# Patient Record
Sex: MALE | Race: WHITE | Employment: OTHER | URBAN - METROPOLITAN AREA
[De-identification: names, ages, dates, MRNs, and addresses within clinical notes are randomized per-mention and may not be internally consistent; named-entity substitution may affect disease eponyms.]

---

## 2017-09-28 ENCOUNTER — HOSPITAL ENCOUNTER (OUTPATIENT)
Dept: SURGERY | Age: 62
Discharge: HOME | End: 2017-09-28

## 2017-09-28 VITALS
WEIGHT: 215 LBS | DIASTOLIC BLOOD PRESSURE: 83 MMHG | HEART RATE: 63 BPM | TEMPERATURE: 98.3 F | OXYGEN SATURATION: 96 % | HEIGHT: 72 IN | SYSTOLIC BLOOD PRESSURE: 135 MMHG | BODY MASS INDEX: 29.12 KG/M2 | RESPIRATION RATE: 18 BRPM

## 2017-09-28 NOTE — PERIOP NOTES
Patient verified name, , and surgery as listed in [facility]. Patient provided medical/health information and PTA medications to the best of their ability. TYPE  CASE:1B  Orders per surgeon:  Received  Labs per surgeon: none  Labs per anesthesia protocol: none  EKG  :  NA    Patient provided with and instructed on education handouts including Guide to Surgery, blood transfusions, pain management, and hand hygiene for the family and community, and [facility] brochure. preop and instructions given per hospital policy. Instructed patient to continue previous medications as prescribed prior to surgery unless otherwise directed and to take the following medications the day of surgery according to anesthesia guidelines : bystolic, ultram prn, spiriva . Instructed patient to hold  the following medications: fish oil, ASA. Original medication prescription bottles not visualized during patient appointment. Patient teach back successful and patient demonstrates knowledge of instruction.

## 2017-10-04 ENCOUNTER — ANESTHESIA EVENT (OUTPATIENT)
Dept: SURGERY | Age: 62
End: 2017-10-04
Payer: COMMERCIAL

## 2017-10-04 RX ORDER — SODIUM CHLORIDE, SODIUM LACTATE, POTASSIUM CHLORIDE, CALCIUM CHLORIDE 600; 310; 30; 20 MG/100ML; MG/100ML; MG/100ML; MG/100ML
75 INJECTION, SOLUTION INTRAVENOUS CONTINUOUS
Status: CANCELLED | OUTPATIENT
Start: 2017-10-04

## 2017-10-04 RX ORDER — OXYCODONE HYDROCHLORIDE 5 MG/1
5 TABLET ORAL
Status: CANCELLED | OUTPATIENT
Start: 2017-10-04

## 2017-10-04 RX ORDER — HYDROCODONE BITARTRATE AND ACETAMINOPHEN 5; 325 MG/1; MG/1
2 TABLET ORAL AS NEEDED
Status: CANCELLED | OUTPATIENT
Start: 2017-10-04

## 2017-10-04 RX ORDER — HYDROMORPHONE HYDROCHLORIDE 2 MG/ML
0.5 INJECTION, SOLUTION INTRAMUSCULAR; INTRAVENOUS; SUBCUTANEOUS
Status: CANCELLED | OUTPATIENT
Start: 2017-10-04

## 2017-10-05 ENCOUNTER — HOSPITAL ENCOUNTER (OUTPATIENT)
Age: 62
Setting detail: OUTPATIENT SURGERY
Discharge: HOME | End: 2017-10-05
Attending: ORTHOPAEDIC SURGERY | Admitting: ORTHOPAEDIC SURGERY
Payer: COMMERCIAL

## 2017-10-05 ENCOUNTER — ANESTHESIA (OUTPATIENT)
Dept: SURGERY | Age: 62
End: 2017-10-05
Payer: COMMERCIAL

## 2017-10-05 ENCOUNTER — APPOINTMENT (OUTPATIENT)
Dept: GENERAL RADIOLOGY | Age: 62
End: 2017-10-05
Attending: ORTHOPAEDIC SURGERY
Payer: COMMERCIAL

## 2017-10-05 VITALS
OXYGEN SATURATION: 91 % | BODY MASS INDEX: 28.13 KG/M2 | HEART RATE: 58 BPM | WEIGHT: 207.38 LBS | TEMPERATURE: 97.6 F | DIASTOLIC BLOOD PRESSURE: 65 MMHG | RESPIRATION RATE: 12 BRPM | SYSTOLIC BLOOD PRESSURE: 126 MMHG

## 2017-10-05 PROCEDURE — 76210000020 HC REC RM PH II FIRST 0.5 HR: Performed by: ORTHOPAEDIC SURGERY

## 2017-10-05 PROCEDURE — 77030033269 HC SLV COMPR SCD KNE2 CARD -B: Performed by: ORTHOPAEDIC SURGERY

## 2017-10-05 PROCEDURE — 77030020754 HC CUF TRNQT 2BLA STRY -B: Performed by: ORTHOPAEDIC SURGERY

## 2017-10-05 PROCEDURE — C1713 ANCHOR/SCREW BN/BN,TIS/BN: HCPCS | Performed by: ORTHOPAEDIC SURGERY

## 2017-10-05 PROCEDURE — 77030002933 HC SUT MCRYL J&J -A: Performed by: ORTHOPAEDIC SURGERY

## 2017-10-05 PROCEDURE — 76010000138 HC OR TIME 0.5 TO 1 HR: Performed by: ORTHOPAEDIC SURGERY

## 2017-10-05 PROCEDURE — 77030029732 HC BIT DRL ORTHOLOC 3DI WRGH -B: Performed by: ORTHOPAEDIC SURGERY

## 2017-10-05 PROCEDURE — 74011000250 HC RX REV CODE- 250

## 2017-10-05 PROCEDURE — 77030018836 HC SOL IRR NACL ICUM -A: Performed by: ORTHOPAEDIC SURGERY

## 2017-10-05 PROCEDURE — 77030003044 HC WRE K WRGH -B: Performed by: ORTHOPAEDIC SURGERY

## 2017-10-05 PROCEDURE — 76210000063 HC OR PH I REC FIRST 0.5 HR: Performed by: ORTHOPAEDIC SURGERY

## 2017-10-05 PROCEDURE — 74011250637 HC RX REV CODE- 250/637: Performed by: ANESTHESIOLOGY

## 2017-10-05 PROCEDURE — 77030011884 HC TAPE CST PLSTR BSNM -A: Performed by: ORTHOPAEDIC SURGERY

## 2017-10-05 PROCEDURE — 76010010054 HC POST OP PAIN BLOCK: Performed by: ORTHOPAEDIC SURGERY

## 2017-10-05 PROCEDURE — 76942 ECHO GUIDE FOR BIOPSY: CPT | Performed by: ORTHOPAEDIC SURGERY

## 2017-10-05 PROCEDURE — 74011250636 HC RX REV CODE- 250/636

## 2017-10-05 PROCEDURE — 77030002916 HC SUT ETHLN J&J -A: Performed by: ORTHOPAEDIC SURGERY

## 2017-10-05 PROCEDURE — 74011250636 HC RX REV CODE- 250/636: Performed by: ORTHOPAEDIC SURGERY

## 2017-10-05 PROCEDURE — 77030003602 HC NDL NRV BLK BBMI -B: Performed by: ANESTHESIOLOGY

## 2017-10-05 PROCEDURE — 74011250636 HC RX REV CODE- 250/636: Performed by: ANESTHESIOLOGY

## 2017-10-05 PROCEDURE — 77030011640 HC PAD GRND REM COVD -A: Performed by: ORTHOPAEDIC SURGERY

## 2017-10-05 PROCEDURE — 76060000033 HC ANESTHESIA 1 TO 1.5 HR: Performed by: ORTHOPAEDIC SURGERY

## 2017-10-05 PROCEDURE — 77030002982 HC SUT POLYSRB J&J -A: Performed by: ORTHOPAEDIC SURGERY

## 2017-10-05 DEVICE — IMPLANTABLE DEVICE
Type: IMPLANTABLE DEVICE | Site: FOOT | Status: FUNCTIONAL
Brand: ORTHOLOC 3DI

## 2017-10-05 DEVICE — MTP FUSION PLATE
Type: IMPLANTABLE DEVICE | Site: FOOT | Status: FUNCTIONAL
Brand: ORTHOLOC 3DI

## 2017-10-05 DEVICE — IMPLANTABLE DEVICE
Type: IMPLANTABLE DEVICE | Site: FOOT | Status: FUNCTIONAL
Brand: ORTHOLOC

## 2017-10-05 RX ORDER — FAMOTIDINE 20 MG/1
20 TABLET, FILM COATED ORAL ONCE
Status: COMPLETED | OUTPATIENT
Start: 2017-10-05 | End: 2017-10-05

## 2017-10-05 RX ORDER — MIDAZOLAM HYDROCHLORIDE 1 MG/ML
5 INJECTION, SOLUTION INTRAMUSCULAR; INTRAVENOUS ONCE
Status: COMPLETED | OUTPATIENT
Start: 2017-10-05 | End: 2017-10-05

## 2017-10-05 RX ORDER — MIDAZOLAM HYDROCHLORIDE 1 MG/ML
2 INJECTION, SOLUTION INTRAMUSCULAR; INTRAVENOUS
Status: DISCONTINUED | OUTPATIENT
Start: 2017-10-05 | End: 2017-10-05 | Stop reason: HOSPADM

## 2017-10-05 RX ORDER — CEFAZOLIN SODIUM IN 0.9 % NACL 2 G/50 ML
2 INTRAVENOUS SOLUTION, PIGGYBACK (ML) INTRAVENOUS ONCE
Status: COMPLETED | OUTPATIENT
Start: 2017-10-05 | End: 2017-10-05

## 2017-10-05 RX ORDER — LIDOCAINE HYDROCHLORIDE 10 MG/ML
0.1 INJECTION INFILTRATION; PERINEURAL AS NEEDED
Status: DISCONTINUED | OUTPATIENT
Start: 2017-10-05 | End: 2017-10-05 | Stop reason: HOSPADM

## 2017-10-05 RX ORDER — LIDOCAINE HYDROCHLORIDE 20 MG/ML
INJECTION, SOLUTION EPIDURAL; INFILTRATION; INTRACAUDAL; PERINEURAL AS NEEDED
Status: DISCONTINUED | OUTPATIENT
Start: 2017-10-05 | End: 2017-10-05 | Stop reason: HOSPADM

## 2017-10-05 RX ORDER — PROPOFOL 10 MG/ML
INJECTION, EMULSION INTRAVENOUS
Status: DISCONTINUED | OUTPATIENT
Start: 2017-10-05 | End: 2017-10-05 | Stop reason: HOSPADM

## 2017-10-05 RX ORDER — SODIUM CHLORIDE, SODIUM LACTATE, POTASSIUM CHLORIDE, CALCIUM CHLORIDE 600; 310; 30; 20 MG/100ML; MG/100ML; MG/100ML; MG/100ML
75 INJECTION, SOLUTION INTRAVENOUS CONTINUOUS
Status: DISCONTINUED | OUTPATIENT
Start: 2017-10-05 | End: 2017-10-05 | Stop reason: HOSPADM

## 2017-10-05 RX ORDER — ROPIVACAINE HYDROCHLORIDE 10 MG/ML
INJECTION EPIDURAL; INFILTRATION; PERINEURAL AS NEEDED
Status: DISCONTINUED | OUTPATIENT
Start: 2017-10-05 | End: 2017-10-05 | Stop reason: HOSPADM

## 2017-10-05 RX ORDER — PROPOFOL 10 MG/ML
INJECTION, EMULSION INTRAVENOUS AS NEEDED
Status: DISCONTINUED | OUTPATIENT
Start: 2017-10-05 | End: 2017-10-05 | Stop reason: HOSPADM

## 2017-10-05 RX ORDER — FENTANYL CITRATE 50 UG/ML
100 INJECTION, SOLUTION INTRAMUSCULAR; INTRAVENOUS ONCE
Status: COMPLETED | OUTPATIENT
Start: 2017-10-05 | End: 2017-10-05

## 2017-10-05 RX ORDER — ROPIVACAINE HYDROCHLORIDE 5 MG/ML
INJECTION, SOLUTION EPIDURAL; INFILTRATION; PERINEURAL AS NEEDED
Status: DISCONTINUED | OUTPATIENT
Start: 2017-10-05 | End: 2017-10-05 | Stop reason: HOSPADM

## 2017-10-05 RX ADMIN — LIDOCAINE HYDROCHLORIDE 20 MG: 20 INJECTION, SOLUTION EPIDURAL; INFILTRATION; INTRACAUDAL; PERINEURAL at 09:18

## 2017-10-05 RX ADMIN — ROPIVACAINE HYDROCHLORIDE 30 ML: 5 INJECTION, SOLUTION EPIDURAL; INFILTRATION; PERINEURAL at 08:24

## 2017-10-05 RX ADMIN — PROPOFOL 30 MG: 10 INJECTION, EMULSION INTRAVENOUS at 09:19

## 2017-10-05 RX ADMIN — MIDAZOLAM 5 MG: 1 INJECTION INTRAMUSCULAR; INTRAVENOUS at 08:15

## 2017-10-05 RX ADMIN — ROPIVACAINE HYDROCHLORIDE 20 ML: 10 INJECTION EPIDURAL; INFILTRATION; PERINEURAL at 08:21

## 2017-10-05 RX ADMIN — FENTANYL CITRATE 50 MCG: 50 INJECTION, SOLUTION INTRAMUSCULAR; INTRAVENOUS at 08:15

## 2017-10-05 RX ADMIN — FAMOTIDINE 20 MG: 20 TABLET ORAL at 07:46

## 2017-10-05 RX ADMIN — PROPOFOL 160 MCG/KG/MIN: 10 INJECTION, EMULSION INTRAVENOUS at 09:20

## 2017-10-05 RX ADMIN — SODIUM CHLORIDE, SODIUM LACTATE, POTASSIUM CHLORIDE, AND CALCIUM CHLORIDE 75 ML/HR: 600; 310; 30; 20 INJECTION, SOLUTION INTRAVENOUS at 07:46

## 2017-10-05 RX ADMIN — CEFAZOLIN 2 G: 1 INJECTION, POWDER, FOR SOLUTION INTRAMUSCULAR; INTRAVENOUS; PARENTERAL at 09:18

## 2017-10-05 NOTE — PERIOP NOTES
PACU DISCHARGE NOTE    Vital signs stable, pain well controlled, alert and oriented times three or at baseline, follow up per surgeon, no anesthetic complications. Discharge instructions given to pt and his wife. Both voice understanding of instructions. Pt has voided and had his IV removed intact. Pt to discharge door via wheelchair and left in care of his wfe.

## 2017-10-05 NOTE — IP AVS SNAPSHOT
[place] 
 
 
 [address] 
[phone] Patient: Zora Garcia MRN: [id number] You are allergic to the following Allergen Reactions Ibuprofen Other (comments) Antiflammatory. - messed with his kidneys Nsaids (Non-Steroidal Anti-Inflammatory Drug) Itching Recent Documentation Weight BMI Smoking Status 94.1 kg 28.13 kg/m2 Former Smoker Emergency Contacts Name Discharge Info Relation Home Work Mobile Sweetie Ge DISCHARGE CAREGIVER [3] Spouse [3]   [phone] About your hospitalization You were admitted on:  October 5, 2017 You last received care in the:  [facility] OP PACU You were discharged on:  October 5, 2017 Unit phone number:  [phone] Why you were hospitalized Your primary diagnosis was:  Not on File Providers Seen During Your Hospitalizations Provider Role Specialty Primary office phone Shweta Rokc MD Attending Provider Orthopedic Surgery [phone] Your Primary Care Physician (PCP) Primary Care Physician Office Phone Office Fax OTHER, PHYS ** None ** ** None ** Follow-up Information None Current Discharge Medication List  
  
ASK your doctor about these medications Dose & Instructions Dispensing Information Comments Morning Noon Evening Bedtime  
 amLODIPine 10 mg tablet Commonly known as:  Candi Rios Your last dose was: Your next dose is:    
   
   
 Dose:  10 mg Take 1 Tab by mouth daily. Quantity:  90 Tab Refills:  1  
     
   
   
   
  
 aspirin delayed-release 325 mg tablet Your last dose was: Your next dose is:    
   
   
 Dose:  325 mg Take 1 Tab by mouth two (2) times a day. Quantity:  60 Tab Refills:  0  
     
   
   
   
  
 lisinopril 40 mg tablet Commonly known as:  Candie Reevesmer  
   
 Your last dose was: Your next dose is:    
   
   
 Dose:  40 mg Take 1 Tab by mouth every evening. Quantity:  90 Tab Refills:  1 MULTI-VITAMIN PO Your last dose was: Your next dose is: Take  by mouth every morning. Indications: did not hold Refills:  0  
     
   
   
   
  
 nebivolol 10 mg tablet Commonly known as:  BYSTOLIC Your last dose was: Your next dose is:    
   
   
 Dose:  10 mg Take 1 Tab by mouth daily. Quantity:  30 Tab Refills:  1 OMEGA 3 PO Your last dose was: Your next dose is:    
   
   
 Dose:  1 Cap Take 1 Cap by mouth nightly. Last dose 9/20/17  Indications: hold as of today Refills:  0  
     
   
   
   
  
 omeprazole 20 mg capsule Commonly known as:  PRILOSEC Your last dose was: Your next dose is:    
   
   
 Dose:  20 mg Take 1 Cap by mouth daily. Quantity:  90 Cap Refills:  1 SPIRIVA WITH HANDIHALER 18 mcg inhalation capsule Generic drug:  tiotropium Your last dose was: Your next dose is:    
   
   
 Dose:  2 Cap Take 2 Caps by inhalation every morning. Indications: BRONCHOSPASM PREVENTION WITH COPD Refills:  0  
     
   
   
   
  
 testosterone cypionate 200 mg/mL injection Commonly known as:  DEPOTESTOTERONE CYPIONATE Your last dose was: Your next dose is: Pt will take 1cc every 7 days. Pt will also need syringes and 22 gauge needles. Quantity:  10 mL Refills:  5  
     
   
   
   
  
 traMADol 50 mg tablet Commonly known as:  ULTRAM  
   
Your last dose was: Your next dose is:    
   
   
 Dose:  50 mg Take 1 Tab by mouth every six (6) hours as needed for Pain. Max Daily Amount: 200 mg. Quantity:  120 Tab Refills:  5 Discharge Instructions INSTRUCTIONS FOLLOWING FOOT SURGERY 
 
ACTIVITY Elevate foot (feet) for 48 hours. Use crutches as directed by your doctor. Protected, partial weight bearing as tolerated in post op shoe, ONLY when full sensation returns. DIET Clear liquids until no nausea or vomiting; then light diet for the first day. Advance to regular diet on second day, unless your doctor orders otherwise. PAIN Take pain medications as directed by your doctor. Call your doctor if pain is NOT relieved by medication. DO NOT take aspirin or blood thinners until directed by your doctor. DRESSING CARE Keep clean and dry until follow up appointment. FOLLOW-UP PHONE CALLS Calls will be made by nursing staff. If you have any problems, call your doctor as needed. CALL YOUR DOCTOR IF YOU HAVE Excessive bleeding that does not stop after holding mild pressure over the area. Temperature of 101 degrees or above. Redness, excessive swelling or bruising, and/or green or yellow, smelly discharge from incision. Loss of sensation - cold, white or blue toes. AFTER ANESTHESIA For the first 24 hours and while taking narcotics for pain: DO NOT Drive, Drink Alcoholic beverages, or make important Decisions. Be aware of dizziness following anesthesia and while taking pain medication. APPOINTMENT DATE/TIME: Follow-up appointment should already be made. Contact Dr. Herbert Dickens office if unsure of date and time. YOUR DOCTOR'S PHONE NUMBER: [phone] DISCHARGE SUMMARY from Nurse PATIENT INSTRUCTIONS: 
 
After general anesthesia or intravenous sedation, for 24 hours or while taking prescription Narcotics: · Limit your activities · Do not drive and operate hazardous machinery · Do not make important personal or business decisions · Do  not drink alcoholic beverages · If you have not urinated within 8 hours after discharge, please contact your surgeon on call. *  Please give a list of your current medications to your Primary Care Provider. *  Please update this list whenever your medications are discontinued, doses are 
    changed, or new medications (including over-the-counter products) are added. *  Please carry medication information at all times in case of emergency situations. These are general instructions for a healthy lifestyle: No smoking/ No tobacco products/ Avoid exposure to second hand smoke Surgeon General's Warning:  Quitting smoking now greatly reduces serious risk to your health. Obesity, smoking, and sedentary lifestyle greatly increases your risk for illness A healthy diet, regular physical exercise & weight monitoring are important for maintaining a healthy lifestyle You may be retaining fluid if you have a history of heart failure or if you experience any of the following symptoms:  Weight gain of 3 pounds or more overnight or 5 pounds in a week, increased swelling in our hands or feet or shortness of breath while lying flat in bed. Please call your doctor as soon as you notice any of these symptoms; do not wait until your next office visit. Recognize signs and symptoms of STROKE: 
 
F-face looks uneven A-arms unable to move or move unevenly S-speech slurred or non-existent T-time-call 911 as soon as signs and symptoms begin-DO NOT go Back to bed or wait to see if you get better-TIME IS BRAIN. Discharge Orders None Introducing \[facility] [place] [facility]\"" & HEALTH SERVICES! Frederick Small introduces [email or web address] patient portal. Now you can access parts of your medical record, email your doctor's office, and request medication refills online. 1. In your internet browser, go to [email or web address] 2. Click on the First Time User? Click Here link in the Sign In box. You will see the New Member Sign Up page. 3. Enter your [employer] Access Code exactly as it appears below. You will not need to use this code after youve completed the sign-up process.  If you do not sign up before the expiration date, you must request a new code. · [employer] Access Code: [email or web address] Expires: 10/12/2017  2:33 PM 
 
4. Enter the last four digits of your Social Security Number (xxxx) and Date of Birth (mm/dd/yyyy) as indicated and click Submit. You will be taken to the next sign-up page. 5. Create a [employer] ID. This will be your [employer] login ID and cannot be changed, so think of one that is secure and easy to remember. 6. Create a [employer] password. You can change your password at any time. 7. Enter your Password Reset Question and Answer. This can be used at a later time if you forget your password. 8. Enter your e-mail address. You will receive e-mail notification when new information is available in [address] 9. Click Sign Up. You can now view and download portions of your medical record. 10. Click the Download Summary menu link to download a portable copy of your medical information. If you have questions, please visit the Frequently Asked Questions section of the [employer] website. Remember, [employer] is NOT to be used for urgent needs. For medical emergencies, dial 911. Now available from your iPhone and Android! General Information Please provide this summary of care documentation to your next provider. Patient Signature:  ____________________________________________________________ Date:  ____________________________________________________________  
  
Pati Ricci Provider Signature:  ____________________________________________________________ Date:  ____________________________________________________________

## 2017-10-05 NOTE — ANESTHESIA POSTPROCEDURE EVALUATION
Post-Anesthesia Evaluation and Assessment    Patient: Abena Sarkar MRN: [id number]  SSN: [id number]    YOB: 1955  Age: 57 y.o. Sex: male       Cardiovascular Function/Vital Signs  Visit Vitals    /80    Pulse 61    Temp 36.4 °C (97.6 °F)    Resp 12    Wt 94.1 kg (207 lb 6 oz)    SpO2 94%    BMI 28.13 kg/m2       Patient is status post general anesthesia for Procedure(s):  RIGHT 1ST METATARSOPHALANGEAL FUSION  WITH CALCANEAL BONE GRAFT/ CHOICE. Nausea/Vomiting: None    Postoperative hydration reviewed and adequate. Pain:  Pain Scale 1: Numeric (0 - 10) (10/05/17 1018)  Pain Intensity 1: 0 (10/05/17 1018)   Managed    Neurological Status:   Neuro (WDL): Exceptions to WDL (10/05/17 1018)  Neuro  Neurologic State: Drowsy (10/05/17 1018)  Orientation Level: Oriented X4 (10/05/17 1018)  LUE Motor Response: Purposeful (10/05/17 1018)  LLE Motor Response: Purposeful (10/05/17 1018)  RUE Motor Response: Purposeful (10/05/17 1018)  RLE Motor Response: Pharmacologically paralyzed (10/05/17 1018)   At baseline    Mental Status and Level of Consciousness: Arousable    Pulmonary Status:   O2 Device: Room air (10/05/17 1018)   Adequate oxygenation and airway patent    Complications related to anesthesia: None    Post-anesthesia assessment completed.  No concerns    Signed By: Greg Solis MD     October 5, 2017

## 2017-10-05 NOTE — BRIEF OP NOTE
BRIEF OPERATIVE NOTE    Date of Procedure: 10/5/2017   Preoperative Diagnosis: Hallux rigidus, right foot [M20.21]  Postoperative Diagnosis: Hallux rigidus, right foot [M20.21]    Procedure(s):  RIGHT 1ST METATARSOPHALANGEAL FUSION  WITH CALCANEAL BONE GRAFT/   Surgeon(s) and Role:     * Flavia Ferrer MD - Primary         Assistant Staff:       Surgical Staff:  Circ-1: Kayli Chacon RN  Radiology Technician: Jay Barnett RT, R  Scrub Tech-1: [facility] Lyubov Villanue[facility]  Event Time In   Incision Start 8821   Incision Close 1007     Anesthesia: Regional   Estimated Blood Loss: min  Specimens: * No specimens in log *   Findings: no  Complications: no  Implants:   Implant Name Type Inv.  Item Serial No.  Lot No. LRB No. Used Action   3.5 HEADED SCREW 3.5 X 32MM    [employer] [id number] 2-6 Right 1 Implanted   PLATE FUSION 3DI MPT MED 0D RT -- ORTHOLOC HALLUX - [id number]  PLATE FUSION 3DI MPT MED 0D RT -- ORTHOLOC HALLUX  [employer] 29 SEPT2019 2-6 Right 1 Implanted   SCR BNE LCK PLT 3DIA 3.5X16MM -- ORTHOLOC HALLUX - [id number]  SCR BNE LCK PLT 3DIA 3.5X16MM -- ORTHOLOC HALLUX  [employer] 29 SEPT2019 2-6 Right 1 Implanted   SCR BNE LCK PLT 3DIA 3.5X18MM -- ORTHOLOC HALLUX - [id number]  SCR BNE LCK PLT 3DIA 3.5X18MM -- ORTHOLOC HALLUX  [employer] 29 SEPT2019 2-6 Right 1 Implanted   SCR BNE LCK PLT 3DIA 3.5X10MM -- ORTHOLOC HALLUX - [id number]  SCR BNE LCK PLT 3DIA 3.5X10MM -- ORTHOLOC HALLUX  [employer] 29 SEPT2019 2-6 Right 1 Implanted   SCR BNE SHARIF NLCK LP 3.5X20MM -- ORTHOLOC HALLUX - [id number]   SCR BNE SHARIF NLCK LP 3.5X20MM -- [id number] 29 SEPT2019 2-6 Right 1 Implanted

## 2017-10-05 NOTE — OP NOTES
[facility]   OPERATIVE REPORT       Name:  Pat Machuca   MR#:  [id number]   :  1955   Account #:  [de-identified]   Date of Adm:  10/05/2017       DATE OF PROCEDURE: 10/05/2017    PREOPERATIVE DIAGNOSIS: Right hallux rigidus. POSTOPERATIVE DIAGNOSIS: Right hallux rigidus. NAME OF PROCEDURE   1. Right 1st metatarsophalangeal arthrodesis. 2. Right calcaneal autograft harvest.    SURGEON: Corazon Mcadams MD    ANESTHESIA: Popliteal block with monitored anesthesia care. ESTIMATED BLOOD LOSS: Minimal.    TOURNIQUET TIME: 38 minutes at 250 mmHg. ANTIBIOTIC PROPHYLAXIS: Ancef given prior to procedure. INDICATIONS FOR PROCEDURE: Mr. Eduardo Calloway is a 78-year-old white male   with symptomatic grade 3 hallux rigidus who has failed   conservative therapy and desires surgical treatment. Risks and   benefits of procedure including, but not limited to, risk of   anesthetic complications, including myocardial infarction,   stroke and death, as well as surgical complications, including   damage to nerves and blood vessels, risk of infection,   incomplete pain relief, risk of malunion, risk of nonunion, need   for additional surgery have been discussed at length with the   patient who understands the risks and wishes to proceed with   surgery at this time. DETAILS OF PROCEDURE: The patient's operative site was marked   with indelible ink in the preoperative holding area. A block was   placed by the Department of Anesthesia. The patient was taken to   the operating room and placed supine. During a preop surgical   timeout, the right lower extremity was identified as the correct   surgical site and prepped and draped in a standard sterile   fashion using ChloraPrep solution. A dorsal approach to the 1st   MTP joint was performed, followed by longitudinal capsulotomy. The medial eminence was resected using the oscillating saw.  A   cup and cone reamer system was used to prepare both sides of the   arthrodesis site that was brought into desired clinical   alignment and then pinned using a K-wire. After confirming   adequate clinical alignment, a lag screw was then placed over   the K-wire, and a dorsal locking plate was affixed using   appropriate length locking and nonlocking screws. Prior to   this, a lateral incision was made over the heel where an Acumed   bone graft harvester was introduced, and cancellous graft was   then obtained and then packed into the graft site prior to   arthrodesis. The wounds were then irrigated and closed using   Vicryl in the capsule, followed by Monocryl and nylon sutures on   the skin. A sterile dressing was then applied. Anesthesia was   discontinued. The patient was subsequently transferred back to   recovery bed and taken to the recovery room in satisfactory   condition. He appeared to tolerate the procedure well. There   were no apparent surgical or anesthetic complications. All   needle and sponge counts were correct.         MD Della Neves / Erick Lopez   D:  10/05/2017   15:34   T:  10/05/2017   15:55   Job #:  [id number]

## 2017-10-05 NOTE — ANESTHESIA PREPROCEDURE EVALUATION
Anesthetic History   No history of anesthetic complications            Review of Systems / Medical History  Patient summary reviewed and pertinent labs reviewed    Pulmonary    COPD: mild               Neuro/Psych   Within defined limits           Cardiovascular    Hypertension: well controlled              Exercise tolerance: >4 METS  Comments: Stent in R lower extremity   GI/Hepatic/Renal     GERD: well controlled    Renal disease: CRI       Endo/Other        Arthritis     Other Findings              Physical Exam    Airway  Mallampati: II  TM Distance: 4 - 6 cm  Neck ROM: normal range of motion   Mouth opening: Normal     Cardiovascular  Regular rate and rhythm,  S1 and S2 normal,  no murmur, click, rub, or gallop             Dental    Dentition: Full upper dentures     Pulmonary  Breath sounds clear to auscultation               Abdominal  GI exam deferred       Other Findings            Anesthetic Plan    ASA: 3  Anesthesia type: general      Post-op pain plan if not by surgeon: peripheral nerve block single    Induction: Intravenous  Anesthetic plan and risks discussed with: Patient and Spouse

## 2017-10-05 NOTE — IP AVS SNAPSHOT
Summary of Care Report The Summary of Care report has been created to help improve care coordination. Users with access to [employer] or [address] (Web-based application) may access additional patient information including the Discharge Summary. If you are not currently a [address] user and need more information, please call the number listed below in the [place] [address] section and ask to be connected with Medical Records. Facility Information Name Address Phone [address] [address] [address] [phone] Patient Information Patient Name Sex ROSEANNA Wood ([id number]) Male 1955 Discharge Information Admitting Provider Service Area Unit Antonina Metz MD / [address] [place] [address] / [phone] Discharge Provider Discharge Date/Time Discharge Disposition Destination (none) (none) (none) (none) Patient Language Language ENGLISH [13] Hospital Problems as of 10/5/2017  Reviewed: 2016  8:24 AM by Doron Lang MD  
 None Non-Hospital Problems as of 10/5/2017  Reviewed: 2016  8:24 AM by Doron Lang MD  
  
  
  
 Class Noted - Resolved Last Modified Active Problems HTN (hypertension)  3/12/2013 - Present 3/14/2013 by Daniel Hobson Entered by Rajan Kahn MD  
  RADHA (acute kidney injury) ([facility], [address]  3/12/2013 - Present 3/14/2013 Entered by Rajan Kahn MD  
  Malignant hypertension  3/14/2013 - Present 3/14/2013 Entered by Daniel Hobson Arthritis of left shoulder region (Chronic)  3/22/2016 - Present 2016 by Shlomo Riley MD  
  Entered by Eugene Palomares PA-C Overview Signed 3/22/2016  2:58 PM by Eugene Palomares PA-C  
   has bone spur - [facility] recommended surgery Esophageal stricture  3/22/2016 - Present 3/22/2016 by SOO Goyal-SHAYY Entered by SOO Goyal-SHAYY Overview Signed 3/22/2016  2:58 PM by Pipe Goncalves PA-C  
   h/o dilation Dyslipidemia  3/22/2016 - Present 3/22/2016 by Pipe Goncalves PA-C Entered by Pipe Goncalves PA-C Arthritis, multiple joint involvement  3/22/2016 - Present 3/22/2016 by Pipe Goncalves PA-C Entered by Pipe Goncalves PA-C Overview Signed 3/22/2016  2:59 PM by Pipe Goncalves PA-C Back, right foot, left shoulder and right knne Low serum testosterone level  3/22/2016 - Present 3/22/2016 by Pipe Goncalves PA-C Entered by Pipe Goncalves PA-C Impotence of organic origin  3/22/2016 - Present 3/22/2016 by Pipe Goncalves PA-C Entered by Pipe Goncalves PA-C Benign non-nodular prostatic hyperplasia with lower urinary tract symptoms  3/22/2016 - Present 7/12/2016 by Venecia All Entered by Pipe Goncalves PA-C Hypertension  3/22/2016 - Present 3/22/2016 by Pipe Goncalves PA-C Entered by Pipe Goncalves PA-C History of colonic polyps  3/22/2016 - Present 3/22/2016 by Pipe Goncalves PA-C Entered by Pipe Goncalves PA-C Benign tumor of skin of leg  3/22/2016 - Present 3/22/2016 by Pipe Goncalves PA-C Entered by Pipe Goncalves PA-C Overview Signed 3/22/2016  3:01 PM by Pipe Goncalves PA-C Had surgeries x 2 and returned and wrapped around the bone now medial aspect Abnormal EKG  3/22/2016 - Present 3/22/2016 by Pipe Goncalves PA-C Entered by Pipe Goncalves PA-C Overview Signed 3/22/2016  3:02 PM by Pipe Goncalves PA-C  
   RBBB on 1/15/2014 GERD (gastroesophageal reflux disease)  9/26/2016 - Present 9/26/2016 by Pipe Goncalves PA-C Entered by Pipe Goncalves PA-C   Status post total replacement of left shoulder  12/20/2016 - Present 12/21/2016 by Martha Dennis MD  
 Entered by Luh Alvarez MD  
  
You are allergic to the following Allergen Reactions Ibuprofen Other (comments) Antiflammatory. - messed with his kidneys Nsaids (Non-Steroidal Anti-Inflammatory Drug) Itching Current Discharge Medication List  
  
ASK your doctor about these medications Dose & Instructions Dispensing Information Comments  
 amLODIPine 10 mg tablet Commonly known as:  Elida Hilario Dose:  10 mg Take 1 Tab by mouth daily. Quantity:  90 Tab Refills:  1  
   
 aspirin delayed-release 325 mg tablet Dose:  325 mg Take 1 Tab by mouth two (2) times a day. Quantity:  60 Tab Refills:  0  
   
 lisinopril 40 mg tablet Commonly known as:  Thersia Kubas Dose:  40 mg Take 1 Tab by mouth every evening. Quantity:  90 Tab Refills:  1 MULTI-VITAMIN PO Take  by mouth every morning. Indications: did not hold Refills:  0  
   
 nebivolol 10 mg tablet Commonly known as:  BYSTOLIC Dose:  10 mg Take 1 Tab by mouth daily. Quantity:  30 Tab Refills:  1 OMEGA 3 PO Dose:  1 Cap Take 1 Cap by mouth nightly. Last dose 9/20/17  Indications: hold as of today Refills:  0  
   
 omeprazole 20 mg capsule Commonly known as:  PRILOSEC Dose:  20 mg Take 1 Cap by mouth daily. Quantity:  90 Cap Refills:  1 SPIRIVA WITH HANDIHALER 18 mcg inhalation capsule Generic drug:  tiotropium Dose:  2 Cap Take 2 Caps by inhalation every morning. Indications: BRONCHOSPASM PREVENTION WITH COPD Refills:  0  
   
 testosterone cypionate 200 mg/mL injection Commonly known as:  DEPOTESTOTERONE CYPIONATE Pt will take 1cc every 7 days. Pt will also need syringes and 22 gauge needles. Quantity:  10 mL Refills:  5  
   
 traMADol 50 mg tablet Commonly known as:  ULTRAM  
 Dose:  50 mg Take 1 Tab by mouth every six (6) hours as needed for Pain. Max Daily Amount: 200 mg. Quantity:  120 Tab Refills:  5 Current Immunizations Name Date Td, Adsorbed PF 3/22/2016 Surgery Information ID Date/Time Status Primary Surgeon All Procedures Location [id number] 10/5/2017 0920 Unposted Susi Domingo MD RIGHT 1ST METATARSOPHALANGEAL FUSION  WITH CALCANEAL BONE GRAFT/ CHOICE [facility] Follow-up Information None Discharge Instructions INSTRUCTIONS FOLLOWING FOOT SURGERY 
 
ACTIVITY Elevate foot (feet) for 48 hours. Use crutches as directed by your doctor. Protected, partial weight bearing as tolerated in post op shoe, ONLY when full sensation returns. DIET Clear liquids until no nausea or vomiting; then light diet for the first day. Advance to regular diet on second day, unless your doctor orders otherwise. PAIN Take pain medications as directed by your doctor. Call your doctor if pain is NOT relieved by medication. DO NOT take aspirin or blood thinners until directed by your doctor. DRESSING CARE Keep clean and dry until follow up appointment. FOLLOW-UP PHONE CALLS Calls will be made by nursing staff. If you have any problems, call your doctor as needed. CALL YOUR DOCTOR IF YOU HAVE Excessive bleeding that does not stop after holding mild pressure over the area. Temperature of 101 degrees or above. Redness, excessive swelling or bruising, and/or green or yellow, smelly discharge from incision. Loss of sensation - cold, white or blue toes. AFTER ANESTHESIA For the first 24 hours and while taking narcotics for pain: DO NOT Drive, Drink Alcoholic beverages, or make important Decisions. Be aware of dizziness following anesthesia and while taking pain medication. APPOINTMENT DATE/TIME: Follow-up appointment should already be made. Contact Dr. Vanessa Marker office if unsure of date and time. YOUR DOCTOR'S PHONE NUMBER: [phone] DISCHARGE SUMMARY from Nurse PATIENT INSTRUCTIONS: 
 
 After general anesthesia or intravenous sedation, for 24 hours or while taking prescription Narcotics: · Limit your activities · Do not drive and operate hazardous machinery · Do not make important personal or business decisions · Do  not drink alcoholic beverages · If you have not urinated within 8 hours after discharge, please contact your surgeon on call. *  Please give a list of your current medications to your Primary Care Provider. *  Please update this list whenever your medications are discontinued, doses are 
    changed, or new medications (including over-the-counter products) are added. *  Please carry medication information at all times in case of emergency situations. These are general instructions for a healthy lifestyle: No smoking/ No tobacco products/ Avoid exposure to second hand smoke Surgeon General's Warning:  Quitting smoking now greatly reduces serious risk to your health. Obesity, smoking, and sedentary lifestyle greatly increases your risk for illness A healthy diet, regular physical exercise & weight monitoring are important for maintaining a healthy lifestyle You may be retaining fluid if you have a history of heart failure or if you experience any of the following symptoms:  Weight gain of 3 pounds or more overnight or 5 pounds in a week, increased swelling in our hands or feet or shortness of breath while lying flat in bed. Please call your doctor as soon as you notice any of these symptoms; do not wait until your next office visit. Recognize signs and symptoms of STROKE: 
 
F-face looks uneven A-arms unable to move or move unevenly S-speech slurred or non-existent T-time-call 911 as soon as signs and symptoms begin-DO NOT go Back to bed or wait to see if you get better-TIME IS BRAIN. Chart Review Routing History Recipient Method Report Sent By Bridgett Martinez DO Fax: [phone] Phone: [phone] Fax IP Auto Routed Yenifer Fisher MD [[id number]] 3/24/2013  5:55 PM 03/24/2013

## 2017-10-05 NOTE — IP AVS SNAPSHOT
[address] 
[phone] Patient: Nancy Kirk MRN: [id number] L:[birth year] Current Discharge Medication List  
  
ASK your doctor about these medications Dose & Instructions Dispensing Information Comments Morning Noon Evening Bedtime  
 amLODIPine 10 mg tablet Commonly known as:  Christiano Katz Your last dose was: Your next dose is:    
   
   
 Dose:  10 mg Take 1 Tab by mouth daily. Quantity:  90 Tab Refills:  1  
     
   
   
   
  
 aspirin delayed-release 325 mg tablet Your last dose was: Your next dose is:    
   
   
 Dose:  325 mg Take 1 Tab by mouth two (2) times a day. Quantity:  60 Tab Refills:  0  
     
   
   
   
  
 lisinopril 40 mg tablet Commonly known as:  Nitesh Casiano Your last dose was: Your next dose is:    
   
   
 Dose:  40 mg Take 1 Tab by mouth every evening. Quantity:  90 Tab Refills:  1 MULTI-VITAMIN PO Your last dose was: Your next dose is: Take  by mouth every morning. Indications: did not hold Refills:  0  
     
   
   
   
  
 nebivolol 10 mg tablet Commonly known as:  BYSTOLIC Your last dose was: Your next dose is:    
   
   
 Dose:  10 mg Take 1 Tab by mouth daily. Quantity:  30 Tab Refills:  1 OMEGA 3 PO Your last dose was: Your next dose is:    
   
   
 Dose:  1 Cap Take 1 Cap by mouth nightly. Last dose 9/20/17  Indications: hold as of today Refills:  0  
     
   
   
   
  
 omeprazole 20 mg capsule Commonly known as:  PRILOSEC Your last dose was: Your next dose is:    
   
   
 Dose:  20 mg Take 1 Cap by mouth daily. Quantity:  90 Cap Refills:  1 SPIRIVA WITH HANDIHALER 18 mcg inhalation capsule Generic drug:  tiotropium Your last dose was: Your next dose is:    
   
   
 Dose:  2 Cap Take 2 Caps by inhalation every morning. Indications: BRONCHOSPASM PREVENTION WITH COPD Refills:  0  
     
   
   
   
  
 testosterone cypionate 200 mg/mL injection Commonly known as:  DEPOTESTOTERONE CYPIONATE Your last dose was: Your next dose is: Pt will take 1cc every 7 days. Pt will also need syringes and 22 gauge needles. Quantity:  10 mL Refills:  5  
     
   
   
   
  
 traMADol 50 mg tablet Commonly known as:  ULTRAM  
   
Your last dose was: Your next dose is:    
   
   
 Dose:  50 mg Take 1 Tab by mouth every six (6) hours as needed for Pain. Max Daily Amount: 200 mg. Quantity:  120 Tab Refills:  5

## 2017-10-05 NOTE — ANESTHESIA PROCEDURE NOTES
Peripheral Block    Start time: 10/5/2017 8:23 AM  End time: 10/5/2017 8:24 AM  Performed by: Brianna Myers  Authorized by: Nayana DE LEÓN       Pre-procedure: Indications: at surgeon's request, post-op pain management and procedure for pain    Preanesthetic Checklist: patient identified, risks and benefits discussed, site marked, timeout performed, anesthesia consent given and patient being monitored    Timeout Time: 08:22          Block Type:   Block Type:   Adductor canal  Laterality:  Right  Monitoring:  Standard ASA monitoring, responsive to questions, continuous pulse ox, oxygen, frequent vital sign checks and heart rate  Injection Technique:  Single shot  Procedures: ultrasound guided    Patient Position: prone  Prep: chlorhexidine    Location:  Mid thigh  Needle Type:  Stimuplex  Needle Gauge:  22 G  Needle Localization:  Ultrasound guidance  Medication Injected:  0.5%  ropivacaine  Adds:  Epi 1:200K  Volume (mL):  30    Assessment:  Number of attempts:  1  Injection Assessment:  Incremental injection every 5 mL, no paresthesia, ultrasound image on chart, local visualized surrounding nerve on ultrasound, negative aspiration for blood and no intravascular symptoms  Patient tolerance:  Patient tolerated the procedure well with no immediate complications

## 2017-10-05 NOTE — DISCHARGE INSTRUCTIONS
INSTRUCTIONS FOLLOWING FOOT SURGERY    ACTIVITY  Elevate foot (feet) for 48 hours. Use crutches as directed by your doctor. Protected, partial weight bearing as tolerated in post op shoe, ONLY when full sensation returns. DIET  Clear liquids until no nausea or vomiting; then light diet for the first day. Advance to regular diet on second day, unless your doctor orders otherwise. PAIN  Take pain medications as directed by your doctor. Call your doctor if pain is NOT relieved by medication. DO NOT take aspirin or blood thinners until directed by your doctor. DRESSING CARE  Keep clean and dry until follow up appointment. FOLLOW-UP PHONE CALLS  Calls will be made by nursing staff. If you have any problems, call your doctor as needed. CALL YOUR DOCTOR IF YOU HAVE  Excessive bleeding that does not stop after holding mild pressure over the area. Temperature of 101 degrees or above. Redness, excessive swelling or bruising, and/or green or yellow, smelly discharge from incision. Loss of sensation - cold, white or blue toes. AFTER ANESTHESIA  For the first 24 hours and while taking narcotics for pain: DO NOT Drive, Drink Alcoholic beverages, or make important Decisions. Be aware of dizziness following anesthesia and while taking pain medication. APPOINTMENT DATE/TIME: Follow-up appointment should already be made. Contact Dr. Melisa Smith office if unsure of date and time. YOUR DOCTOR'S PHONE NUMBER: [phone]      DISCHARGE SUMMARY from Nurse    PATIENT INSTRUCTIONS:    After general anesthesia or intravenous sedation, for 24 hours or while taking prescription Narcotics:  · Limit your activities  · Do not drive and operate hazardous machinery  · Do not make important personal or business decisions  · Do  not drink alcoholic beverages  · If you have not urinated within 8 hours after discharge, please contact your surgeon on call.     *  Please give a list of your current medications to your Primary Care Provider. *  Please update this list whenever your medications are discontinued, doses are      changed, or new medications (including over-the-counter products) are added. *  Please carry medication information at all times in case of emergency situations. These are general instructions for a healthy lifestyle:    No smoking/ No tobacco products/ Avoid exposure to second hand smoke    Surgeon General's Warning:  Quitting smoking now greatly reduces serious risk to your health. Obesity, smoking, and sedentary lifestyle greatly increases your risk for illness    A healthy diet, regular physical exercise & weight monitoring are important for maintaining a healthy lifestyle    You may be retaining fluid if you have a history of heart failure or if you experience any of the following symptoms:  Weight gain of 3 pounds or more overnight or 5 pounds in a week, increased swelling in our hands or feet or shortness of breath while lying flat in bed. Please call your doctor as soon as you notice any of these symptoms; do not wait until your next office visit. Recognize signs and symptoms of STROKE:    F-face looks uneven    A-arms unable to move or move unevenly    S-speech slurred or non-existent    T-time-call 911 as soon as signs and symptoms begin-DO NOT go       Back to bed or wait to see if you get better-TIME IS BRAIN.

## 2017-10-05 NOTE — ANESTHESIA PROCEDURE NOTES
Peripheral Block    Start time: 10/5/2017 8:17 AM  End time: 10/5/2017 8:21 AM  Performed by: Kassi Awan  Authorized by: Lidya DE LEÓN       Pre-procedure:    Indications: at surgeon's request, post-op pain management and procedure for pain    Preanesthetic Checklist: patient identified, risks and benefits discussed, site marked, timeout performed, anesthesia consent given and patient being monitored    Timeout Time: 08:15          Block Type:   Block Type:  Popliteal  Laterality:  Right  Monitoring:  Standard ASA monitoring, responsive to questions, oxygen, continuous pulse ox, frequent vital sign checks and heart rate  Injection Technique:  Single shot  Procedures: ultrasound guided and nerve stimulator    Prep: chlorhexidine    Location:  Lower thigh  Needle Type:  Stimuplex  Needle Gauge:  22 G  Needle Localization:  Nerve stimulator and ultrasound guidance  Motor Response: minimal motor response >0.4 mA    Medication Injected:  1.0%  ropivacaine  Adds:  Epi 1:200K  Volume (mL):  20  Add'l Medication Injected:  2.0%  mepivacaine  Adds:  Epi 1:200K  Volume (mL):  20    Assessment:  Number of attempts:  1  Injection Assessment:  Incremental injection every 5 mL, no paresthesia, ultrasound image on chart, no intravascular symptoms, negative aspiration for blood and local visualized surrounding nerve on ultrasound  Patient tolerance:  Patient tolerated the procedure well with no immediate complications

## 2017-10-24 ENCOUNTER — HOSPITAL ENCOUNTER (OUTPATIENT)
Dept: SLEEP MEDICINE | Age: 62
Discharge: HOME | End: 2017-10-24
Payer: COMMERCIAL

## 2017-10-24 PROCEDURE — 95810 POLYSOM 6/> YRS 4/> PARAM: CPT

## 2017-11-08 ENCOUNTER — HOSPITAL ENCOUNTER (OUTPATIENT)
Dept: SLEEP MEDICINE | Age: 62
Discharge: HOME | End: 2017-11-08
Payer: COMMERCIAL

## 2017-11-08 PROCEDURE — 95811 POLYSOM 6/>YRS CPAP 4/> PARM: CPT

## 2017-11-28 PROBLEM — J44.9 COPD, MILD (HCC): Chronic | Status: ACTIVE | Noted: 2017-11-28

## 2017-11-28 PROBLEM — G47.33 OSA (OBSTRUCTIVE SLEEP APNEA): Status: ACTIVE | Noted: 2017-11-28

## 2017-11-28 PROBLEM — J98.4 RESTRICTIVE LUNG DISEASE: Chronic | Status: ACTIVE | Noted: 2017-11-28

## 2018-01-30 ENCOUNTER — HOSPITAL ENCOUNTER (OUTPATIENT)
Dept: GENERAL RADIOLOGY | Age: 63
Discharge: HOME | End: 2018-01-30
Payer: MEDICARE

## 2018-01-30 DIAGNOSIS — R07.9 CHEST PAIN, UNSPECIFIED TYPE: ICD-10-CM

## 2018-01-30 PROCEDURE — 71046 X-RAY EXAM CHEST 2 VIEWS: CPT

## 2018-03-30 ENCOUNTER — HOSPITAL ENCOUNTER (OUTPATIENT)
Dept: ULTRASOUND IMAGING | Age: 63
Discharge: HOME | End: 2018-03-30
Attending: FAMILY MEDICINE
Payer: MEDICARE

## 2018-03-30 DIAGNOSIS — Z13.6 SCREENING FOR AAA (ABDOMINAL AORTIC ANEURYSM): ICD-10-CM

## 2018-03-30 DIAGNOSIS — Z72.0 TOBACCO ABUSE: ICD-10-CM

## 2018-03-30 PROCEDURE — 93978 VASCULAR STUDY: CPT

## 2018-06-29 ENCOUNTER — HOSPITAL ENCOUNTER (OUTPATIENT)
Dept: LAB | Age: 63
Discharge: HOME | End: 2018-06-29

## 2018-06-29 PROCEDURE — 88305 TISSUE EXAM BY PATHOLOGIST: CPT | Performed by: INTERNAL MEDICINE

## 2018-08-09 ENCOUNTER — HOSPITAL ENCOUNTER (OUTPATIENT)
Dept: ULTRASOUND IMAGING | Age: 63
Discharge: HOME | End: 2018-08-09
Payer: MEDICARE

## 2018-08-09 DIAGNOSIS — I65.23 ATHEROSCLEROSIS OF BOTH CAROTID ARTERIES: ICD-10-CM

## 2018-08-09 PROCEDURE — 93880 EXTRACRANIAL BILAT STUDY: CPT

## 2019-05-06 RX ORDER — CEFAZOLIN SODIUM/WATER 2 G/20 ML
2 SYRINGE (ML) INTRAVENOUS ONCE
Status: CANCELLED | OUTPATIENT
Start: 2019-05-06 | End: 2019-05-06

## 2019-05-15 ENCOUNTER — HOSPITAL ENCOUNTER (OUTPATIENT)
Dept: SURGERY | Age: 64
Discharge: HOME | End: 2019-05-15
Payer: MEDICARE

## 2019-05-15 VITALS
DIASTOLIC BLOOD PRESSURE: 83 MMHG | RESPIRATION RATE: 16 BRPM | HEIGHT: 72 IN | BODY MASS INDEX: 29.06 KG/M2 | TEMPERATURE: 98 F | HEART RATE: 85 BPM | SYSTOLIC BLOOD PRESSURE: 140 MMHG | WEIGHT: 214.56 LBS | OXYGEN SATURATION: 95 %

## 2019-05-15 LAB
BACTERIA SPEC CULT: NORMAL
SERVICE CMNT-IMP: NORMAL

## 2019-05-15 PROCEDURE — 87641 MR-STAPH DNA AMP PROBE: CPT

## 2019-05-15 PROCEDURE — 77030027138 HC INCENT SPIROMETER -A

## 2019-05-15 RX ORDER — TESTOSTERONE CYPIONATE 200 MG/ML
200 INJECTION INTRAMUSCULAR
COMMUNITY

## 2019-05-15 RX ORDER — METOPROLOL SUCCINATE 25 MG/1
25 TABLET, EXTENDED RELEASE ORAL DAILY
COMMUNITY

## 2019-05-15 RX ORDER — PRAVASTATIN SODIUM 20 MG/1
20 TABLET ORAL
COMMUNITY

## 2019-05-15 RX ORDER — GUAIFENESIN 100 MG/5ML
81 LIQUID (ML) ORAL DAILY
COMMUNITY

## 2019-05-15 NOTE — H&P
????? 
Chief complaint: Back and leg pain. History of present illness: The patient returns today with persistent symptoms of lumbar deficit as previously described. The symptoms are worse on the right. The symptoms have been present for Greater than 4 months. The symptoms are described as Pain across his back with sharp shooting sensation into the right lateral leg. He also describes heaviness and aching in bilateral hips after walking about 200 feet. Pain is worse with standing and walking. He notes that pushing a cart in the store seems to help. . The symptoms are worse with activities. There has been no lasting benefit from conservative efforts including Activity modification, tramadol, NSAIDs. MRI scan of the lumbar spine was ordered to evaluate for neurogenic claudication. He returns today to review this. He is also being followed by Dr. Mary Mora at the cancer center. Richar Guajardoie been following abnormal blood work and his levels are staying low and stable. PMHx/PSHx/Social History/Medications/Allergies/ROS are documented separately and have been reviewed. Peripheral arterial disease, hypertension, high cholesterol ROS: Denies fever, chills, chest pain. ????? Medications: 
????? Lisinopril (40 MG);Metoprolol Succinate;Norvasc; Omeprazole (10 MG); Statin; Testosterone Cypionate (200 MG/ML); Testosterone;TraMADol HCl (50 MG) Allergies: 
????? 
NSAID's 
 
Physical Exam: This is a well developed well nourished adult male in no acute distress. Mood and affect are appropriate. Oriented to person, place, and time. Head is normocephalic and atraumatic. Extraocular movements intact. Sclera anicteric. Respirations are unlabored and there is no evidence of cyanosis. Chest is clear to auscultation. Heart is regular rate and rhythm. Abdomen is soft. Straight leg testing is positive right. There is subjective light touch sensory loss over the right lateral foot. Reflexes Right Left Quadriceps (L4) 2 2 Achilles (S1) 2 2 Ankle jerk is negative for clonus Strength testing in the lower extremity reveals the following based on the 5 point grading scale: 
 
 HF (L2) H Ab (L5) KE (L3/4) ADF (L4) EHL (L5) A Ev (S1) APF (S1) Right 5 5 5 5 5 5 5 Left 5 5 5 5 5 5 5 The feet are warm with good capillary refill and palpable pedal pulses. Radiographic Studies: 
 
X-rays and MRI were reviewed. MRI scan reveals a L2-3 right greater than left facet arthropathy with stenosis or right-sided lateral recess stenosis. L3-4) Central disc osteophyte complex with epidural lipomatosis and facet arthropathy resulting in severe central stenosis. L4-5 moderate central stenosis and lateral recess stenosis Assessment/Plan: This patients clinical history and physical exam is consistent with neurogenic claudication and right radiculopathy secondary to multiple levels of lumbar stenosis. The imaging studies are concordant with the patients symptoms. Conservative efforts have been reasonably exhausted and the patient feels like he cannot go on with the symptoms as they are. We have previously discussed surgical options and now he would like to proceed with surgical scheduling. It is likely he would not benefit long-term from lumbar injections due to the severity of his stenosis. ????? We discussed the details of the surgery including a midline incision in over the low back followed by dissection to the area of stenosis. The nerves would be freed up by trimming any impinging structures including ligaments and bone. Once the nerves are freed the wound would be closed with suture and covered with sterile dressings. The patient would expect to stay in the hospital 1-2 days or until he can get about safely with minimal assistance. A short stay in a rehabilitation facility could also be considered depending on how quickly he recovers.   Follow-up would be scheduled for 2-3 weeks and he would have restrictions including no driving, and no lifting greater than 15 lbs until follow up with me. We also discussed the potential risks of the surgery including, but not limited to infection, spinal fluid leak and potential headaches requiring him to remain supine or have a lumbar drain inserted post-operatively; injury to the cauda equina or peripheral nerve root resulting in paralysis, bowel or bladder dysfunction, or loss of use of an extremity; persistent back or leg symptoms, recurrence of stenosis or the development of instability possibly needing additional surgery;  blood loss requiring transfusion; and the risks of anesthesia including, but not limited heart attack, stroke, and blood clot. The patient voiced an understanding of these issues and would like to proceed with surgery. The surgery that I believe would be most beneficial here is a L2-L5 laminectomy. Sacha table with sling. 
 
 
????? ????? Electronically Signed By Penny Cerda PA-C and Cornelius Zayas MD on 4/30/2019

## 2019-05-15 NOTE — PERIOP NOTES
Lab results reviewed and routed to surgeon Recent Results (from the past 12 hour(s)) MSSA/MRSA SC BY PCR, NASAL SWAB Collection Time: 05/15/19  9:32 AM  
Result Value Ref Range Special Requests: NO SPECIAL REQUESTS Culture result:     
  SA target not detected. A MRSA NEGATIVE, SA NEGATIVE test result does not preclude MRSA or SA nasal colonization.

## 2019-05-15 NOTE — PERIOP NOTES
Patient verified name & . Order to obtain consent  found in EHR  and matches case posting. TYPE  CASE:ll             
Orders:  received Labs per Spine protocol:  MRSA Labs per anesthesia protocol: None EKG/cardiac records  :  2017 NSR with Right BBB, Echo 2017 normal, Stress test 2017 normal, all in care everywhere. Patient denies any cardiac hx Glucose: None Medication list visualized today. Instructed patient to continue previous medications as prescribed prior to surgery and  to [address] according to anesthesia guidelines with a small sip of water : Metoprolol Continue all previous medications unless otherwise directed. Instructed patient to hold all vitamins and supplements (with exception of renal vitamins) and the following medications prior to surgery: Vitamins / Supplements Pt viewed Spine Pre-hab Video. All further questions were addressed. Pt was provided with antibacterial or non-moisturizing soap, Hibiclens, long-handled sponge, Spine Recovery booklet and incentive spirometer. Pt correctly demonstrated use of incentive spirometer and instruction to bring it to the hospital on day of surgery. Handouts and all Surgery instructions provided to pt and pt verbalizes understanding. Patient Guide to Surgery Packet provided to patient. Packet includes Patient Guide to surgery handout, Facts about Pain Management handout, Facts about Urinary Catherization handout, Hand Hygiene handout, Patient Education and Teaching Sheet -Transfusion of Blood and Blood Products handout, and  [facility] frequent question and answer sheet. Guide reviewed with the patient and all questions answered to the satisfaction of the patient. Pt advised to visit [email or web address] for more educational information regarding anesthesia and to record any additional questions that arise so that it can be addressed by the anesthesiologist on the morning of surgery. Patient instructed on the following and verbalized understanding: 
Arrive at main entrance, time of arrival to be called the day before by 1700. Responsible adult must drive patient to and from hospital, stay during surgery and 24 hours postoperatively. Npo after midnight including gum, mints and ice chips. Shower using hibiclens the night before and the morning of surgery. Hibiclens provided to the patient with handout and verbal instructions for use. Leave all valuables at home. Instructed on no jewelry or body piercings on the dos. Bring insurance card and ID. No perfumes, oil, powder, colognes, makeup or  lotions on the skin. Patient verbalized understanding of all instructions and provided all medical/health information to the best of their ability.

## 2019-05-23 ENCOUNTER — ANESTHESIA EVENT (OUTPATIENT)
Dept: SURGERY | Age: 64
End: 2019-05-23
Payer: MEDICARE

## 2019-05-24 ENCOUNTER — APPOINTMENT (OUTPATIENT)
Dept: GENERAL RADIOLOGY | Age: 64
End: 2019-05-24
Attending: ORTHOPAEDIC SURGERY
Payer: MEDICARE

## 2019-05-24 ENCOUNTER — HOSPITAL ENCOUNTER (OUTPATIENT)
Age: 64
Setting detail: OBSERVATION
Discharge: HOME | End: 2019-05-25
Attending: ORTHOPAEDIC SURGERY | Admitting: ORTHOPAEDIC SURGERY
Payer: MEDICARE

## 2019-05-24 ENCOUNTER — ANESTHESIA (OUTPATIENT)
Dept: SURGERY | Age: 64
End: 2019-05-24
Payer: MEDICARE

## 2019-05-24 DIAGNOSIS — M48.061 SPINAL STENOSIS OF LUMBAR REGION AT MULTIPLE LEVELS: Primary | ICD-10-CM

## 2019-05-24 PROBLEM — M48.00 SPINAL STENOSIS: Status: ACTIVE | Noted: 2019-05-24

## 2019-05-24 PROCEDURE — 77030019940 HC BLNKT HYPOTHRM STRY -B: Performed by: ANESTHESIOLOGY

## 2019-05-24 PROCEDURE — 74011250636 HC RX REV CODE- 250/636

## 2019-05-24 PROCEDURE — 77030018836 HC SOL IRR NACL ICUM -A: Performed by: ORTHOPAEDIC SURGERY

## 2019-05-24 PROCEDURE — 77030032490 HC SLV COMPR SCD KNE COVD -B: Performed by: ORTHOPAEDIC SURGERY

## 2019-05-24 PROCEDURE — 76210000017 HC OR PH I REC 1.5 TO 2 HR: Performed by: ORTHOPAEDIC SURGERY

## 2019-05-24 PROCEDURE — 74011250636 HC RX REV CODE- 250/636: Performed by: ANESTHESIOLOGY

## 2019-05-24 PROCEDURE — 74011250637 HC RX REV CODE- 250/637: Performed by: ANESTHESIOLOGY

## 2019-05-24 PROCEDURE — 74011250636 HC RX REV CODE- 250/636: Performed by: ORTHOPAEDIC SURGERY

## 2019-05-24 PROCEDURE — 77030014647 HC SEAL FBRN TISSL BAXT -D: Performed by: ORTHOPAEDIC SURGERY

## 2019-05-24 PROCEDURE — 99218 HC RM OBSERVATION: CPT

## 2019-05-24 PROCEDURE — 74011000272 HC RX REV CODE- 272: Performed by: ORTHOPAEDIC SURGERY

## 2019-05-24 PROCEDURE — 76060000036 HC ANESTHESIA 2.5 TO 3 HR: Performed by: ORTHOPAEDIC SURGERY

## 2019-05-24 PROCEDURE — 77030031139 HC SUT VCRL2 J&J -A: Performed by: ORTHOPAEDIC SURGERY

## 2019-05-24 PROCEDURE — 74011000250 HC RX REV CODE- 250: Performed by: ORTHOPAEDIC SURGERY

## 2019-05-24 PROCEDURE — 77030019908 HC STETH ESOPH SIMS -A: Performed by: ANESTHESIOLOGY

## 2019-05-24 PROCEDURE — 77030039425 HC BLD LARYNG TRULITE DISP TELE -A: Performed by: ANESTHESIOLOGY

## 2019-05-24 PROCEDURE — 74011250637 HC RX REV CODE- 250/637: Performed by: ORTHOPAEDIC SURGERY

## 2019-05-24 PROCEDURE — 77030025623 HC BUR RND PRECIS STRY -D: Performed by: ORTHOPAEDIC SURGERY

## 2019-05-24 PROCEDURE — 76010000171 HC OR TIME 2 TO 2.5 HR INTENSV-TIER 1: Performed by: ORTHOPAEDIC SURGERY

## 2019-05-24 PROCEDURE — 77030037088 HC TUBE ENDOTRACH ORAL NSL COVD-A: Performed by: ANESTHESIOLOGY

## 2019-05-24 PROCEDURE — 77030012894: Performed by: ORTHOPAEDIC SURGERY

## 2019-05-24 PROCEDURE — 74011000258 HC RX REV CODE- 258

## 2019-05-24 PROCEDURE — 77030003028 HC SUT VCRL J&J -A: Performed by: ORTHOPAEDIC SURGERY

## 2019-05-24 PROCEDURE — 77030029099 HC BN WAX SSPC -A: Performed by: ORTHOPAEDIC SURGERY

## 2019-05-24 PROCEDURE — 77030012406 HC DRN WND PENRS BARD -A: Performed by: ORTHOPAEDIC SURGERY

## 2019-05-24 PROCEDURE — 74011000250 HC RX REV CODE- 250

## 2019-05-24 PROCEDURE — 72020 X-RAY EXAM OF SPINE 1 VIEW: CPT

## 2019-05-24 RX ORDER — HYDROMORPHONE HYDROCHLORIDE 2 MG/ML
0.5 INJECTION, SOLUTION INTRAMUSCULAR; INTRAVENOUS; SUBCUTANEOUS
Status: DISCONTINUED | OUTPATIENT
Start: 2019-05-24 | End: 2019-05-24 | Stop reason: HOSPADM

## 2019-05-24 RX ORDER — PANTOPRAZOLE SODIUM 40 MG/1
40 TABLET, DELAYED RELEASE ORAL
Status: DISCONTINUED | OUTPATIENT
Start: 2019-05-25 | End: 2019-05-25 | Stop reason: HOSPADM

## 2019-05-24 RX ORDER — GABAPENTIN 300 MG/1
300 CAPSULE ORAL ONCE
Status: COMPLETED | OUTPATIENT
Start: 2019-05-24 | End: 2019-05-24

## 2019-05-24 RX ORDER — BUPIVACAINE HYDROCHLORIDE AND EPINEPHRINE 5; 5 MG/ML; UG/ML
INJECTION, SOLUTION EPIDURAL; INTRACAUDAL; PERINEURAL AS NEEDED
Status: DISCONTINUED | OUTPATIENT
Start: 2019-05-24 | End: 2019-05-24 | Stop reason: HOSPADM

## 2019-05-24 RX ORDER — FAMOTIDINE 20 MG/1
20 TABLET, FILM COATED ORAL EVERY 12 HOURS
Status: DISCONTINUED | OUTPATIENT
Start: 2019-05-24 | End: 2019-05-25 | Stop reason: HOSPADM

## 2019-05-24 RX ORDER — SODIUM CHLORIDE, SODIUM LACTATE, POTASSIUM CHLORIDE, CALCIUM CHLORIDE 600; 310; 30; 20 MG/100ML; MG/100ML; MG/100ML; MG/100ML
75 INJECTION, SOLUTION INTRAVENOUS CONTINUOUS
Status: DISCONTINUED | OUTPATIENT
Start: 2019-05-24 | End: 2019-05-24 | Stop reason: HOSPADM

## 2019-05-24 RX ORDER — ROCURONIUM BROMIDE 10 MG/ML
INJECTION, SOLUTION INTRAVENOUS AS NEEDED
Status: DISCONTINUED | OUTPATIENT
Start: 2019-05-24 | End: 2019-05-24 | Stop reason: HOSPADM

## 2019-05-24 RX ORDER — METOPROLOL SUCCINATE 25 MG/1
25 TABLET, EXTENDED RELEASE ORAL DAILY
Status: DISCONTINUED | OUTPATIENT
Start: 2019-05-25 | End: 2019-05-25 | Stop reason: HOSPADM

## 2019-05-24 RX ORDER — TRAMADOL HYDROCHLORIDE 50 MG/1
50 TABLET ORAL
Status: DISCONTINUED | OUTPATIENT
Start: 2019-05-24 | End: 2019-05-25 | Stop reason: HOSPADM

## 2019-05-24 RX ORDER — SODIUM CHLORIDE, SODIUM LACTATE, POTASSIUM CHLORIDE, CALCIUM CHLORIDE 600; 310; 30; 20 MG/100ML; MG/100ML; MG/100ML; MG/100ML
INJECTION, SOLUTION INTRAVENOUS
Status: DISCONTINUED | OUTPATIENT
Start: 2019-05-24 | End: 2019-05-24 | Stop reason: HOSPADM

## 2019-05-24 RX ORDER — EPHEDRINE SULFATE 50 MG/ML
INJECTION, SOLUTION INTRAVENOUS AS NEEDED
Status: DISCONTINUED | OUTPATIENT
Start: 2019-05-24 | End: 2019-05-24 | Stop reason: HOSPADM

## 2019-05-24 RX ORDER — LIDOCAINE HYDROCHLORIDE ANHYDROUS AND DEXTROSE MONOHYDRATE .4; 5 G/100ML; G/100ML
INJECTION, SOLUTION INTRAVENOUS
Status: DISCONTINUED | OUTPATIENT
Start: 2019-05-24 | End: 2019-05-24 | Stop reason: HOSPADM

## 2019-05-24 RX ORDER — FLUMAZENIL 0.1 MG/ML
0.2 INJECTION INTRAVENOUS
Status: DISCONTINUED | OUTPATIENT
Start: 2019-05-24 | End: 2019-05-24 | Stop reason: HOSPADM

## 2019-05-24 RX ORDER — KETAMINE HYDROCHLORIDE 100 MG/ML
INJECTION, SOLUTION INTRAMUSCULAR; INTRAVENOUS AS NEEDED
Status: DISCONTINUED | OUTPATIENT
Start: 2019-05-24 | End: 2019-05-24 | Stop reason: HOSPADM

## 2019-05-24 RX ORDER — OXYCODONE HYDROCHLORIDE 5 MG/1
5 TABLET ORAL
Status: COMPLETED | OUTPATIENT
Start: 2019-05-24 | End: 2019-05-24

## 2019-05-24 RX ORDER — DEXTROSE, SODIUM CHLORIDE, AND POTASSIUM CHLORIDE 5; .45; .15 G/100ML; G/100ML; G/100ML
100 INJECTION INTRAVENOUS CONTINUOUS
Status: DISCONTINUED | OUTPATIENT
Start: 2019-05-24 | End: 2019-05-25 | Stop reason: HOSPADM

## 2019-05-24 RX ORDER — PRAVASTATIN SODIUM 20 MG/1
20 TABLET ORAL
Status: DISCONTINUED | OUTPATIENT
Start: 2019-05-24 | End: 2019-05-25 | Stop reason: HOSPADM

## 2019-05-24 RX ORDER — DEXAMETHASONE SODIUM PHOSPHATE 4 MG/ML
INJECTION, SOLUTION INTRA-ARTICULAR; INTRALESIONAL; INTRAMUSCULAR; INTRAVENOUS; SOFT TISSUE AS NEEDED
Status: DISCONTINUED | OUTPATIENT
Start: 2019-05-24 | End: 2019-05-24 | Stop reason: HOSPADM

## 2019-05-24 RX ORDER — SODIUM CHLORIDE 0.9 % (FLUSH) 0.9 %
5-40 SYRINGE (ML) INJECTION EVERY 8 HOURS
Status: DISCONTINUED | OUTPATIENT
Start: 2019-05-24 | End: 2019-05-25 | Stop reason: HOSPADM

## 2019-05-24 RX ORDER — SODIUM CHLORIDE, SODIUM LACTATE, POTASSIUM CHLORIDE, CALCIUM CHLORIDE 600; 310; 30; 20 MG/100ML; MG/100ML; MG/100ML; MG/100ML
100 INJECTION, SOLUTION INTRAVENOUS CONTINUOUS
Status: DISCONTINUED | OUTPATIENT
Start: 2019-05-24 | End: 2019-05-24 | Stop reason: HOSPADM

## 2019-05-24 RX ORDER — DIPHENHYDRAMINE HYDROCHLORIDE 50 MG/ML
12.5 INJECTION, SOLUTION INTRAMUSCULAR; INTRAVENOUS
Status: DISCONTINUED | OUTPATIENT
Start: 2019-05-24 | End: 2019-05-24 | Stop reason: HOSPADM

## 2019-05-24 RX ORDER — ACETAMINOPHEN 500 MG
1000 TABLET ORAL ONCE
Status: COMPLETED | OUTPATIENT
Start: 2019-05-24 | End: 2019-05-24

## 2019-05-24 RX ORDER — GLYCOPYRROLATE 0.2 MG/ML
INJECTION INTRAMUSCULAR; INTRAVENOUS AS NEEDED
Status: DISCONTINUED | OUTPATIENT
Start: 2019-05-24 | End: 2019-05-24 | Stop reason: HOSPADM

## 2019-05-24 RX ORDER — LIDOCAINE HYDROCHLORIDE 20 MG/ML
INJECTION, SOLUTION EPIDURAL; INFILTRATION; INTRACAUDAL; PERINEURAL AS NEEDED
Status: DISCONTINUED | OUTPATIENT
Start: 2019-05-24 | End: 2019-05-24 | Stop reason: HOSPADM

## 2019-05-24 RX ORDER — CEFAZOLIN SODIUM/WATER 2 G/20 ML
2 SYRINGE (ML) INTRAVENOUS EVERY 8 HOURS
Status: COMPLETED | OUTPATIENT
Start: 2019-05-24 | End: 2019-05-25

## 2019-05-24 RX ORDER — SODIUM CHLORIDE 0.9 % (FLUSH) 0.9 %
5-40 SYRINGE (ML) INJECTION AS NEEDED
Status: DISCONTINUED | OUTPATIENT
Start: 2019-05-24 | End: 2019-05-25 | Stop reason: HOSPADM

## 2019-05-24 RX ORDER — DIPHENHYDRAMINE HCL 25 MG
25 CAPSULE ORAL
Status: DISCONTINUED | OUTPATIENT
Start: 2019-05-24 | End: 2019-05-25 | Stop reason: HOSPADM

## 2019-05-24 RX ORDER — HYDROCODONE BITARTRATE AND ACETAMINOPHEN 10; 325 MG/1; MG/1
1 TABLET ORAL
Status: DISCONTINUED | OUTPATIENT
Start: 2019-05-24 | End: 2019-05-25 | Stop reason: HOSPADM

## 2019-05-24 RX ORDER — CEFAZOLIN SODIUM/WATER 2 G/20 ML
2 SYRINGE (ML) INTRAVENOUS ONCE
Status: COMPLETED | OUTPATIENT
Start: 2019-05-24 | End: 2019-05-24

## 2019-05-24 RX ORDER — PROPOFOL 10 MG/ML
INJECTION, EMULSION INTRAVENOUS AS NEEDED
Status: DISCONTINUED | OUTPATIENT
Start: 2019-05-24 | End: 2019-05-24 | Stop reason: HOSPADM

## 2019-05-24 RX ORDER — ONDANSETRON 4 MG/1
4 TABLET, ORALLY DISINTEGRATING ORAL
Status: DISCONTINUED | OUTPATIENT
Start: 2019-05-24 | End: 2019-05-25 | Stop reason: HOSPADM

## 2019-05-24 RX ORDER — FENTANYL CITRATE 50 UG/ML
INJECTION, SOLUTION INTRAMUSCULAR; INTRAVENOUS AS NEEDED
Status: DISCONTINUED | OUTPATIENT
Start: 2019-05-24 | End: 2019-05-24 | Stop reason: HOSPADM

## 2019-05-24 RX ORDER — VANCOMYCIN HYDROCHLORIDE 1 G/20ML
INJECTION, POWDER, LYOPHILIZED, FOR SOLUTION INTRAVENOUS AS NEEDED
Status: DISCONTINUED | OUTPATIENT
Start: 2019-05-24 | End: 2019-05-24 | Stop reason: HOSPADM

## 2019-05-24 RX ORDER — AMLODIPINE BESYLATE 10 MG/1
10 TABLET ORAL
Status: DISCONTINUED | OUTPATIENT
Start: 2019-05-24 | End: 2019-05-25 | Stop reason: HOSPADM

## 2019-05-24 RX ORDER — SUCCINYLCHOLINE CHLORIDE 20 MG/ML
INJECTION INTRAMUSCULAR; INTRAVENOUS AS NEEDED
Status: DISCONTINUED | OUTPATIENT
Start: 2019-05-24 | End: 2019-05-24 | Stop reason: HOSPADM

## 2019-05-24 RX ORDER — NEOSTIGMINE METHYLSULFATE 1 MG/ML
INJECTION INTRAVENOUS AS NEEDED
Status: DISCONTINUED | OUTPATIENT
Start: 2019-05-24 | End: 2019-05-24 | Stop reason: HOSPADM

## 2019-05-24 RX ORDER — ONDANSETRON 2 MG/ML
INJECTION INTRAMUSCULAR; INTRAVENOUS AS NEEDED
Status: DISCONTINUED | OUTPATIENT
Start: 2019-05-24 | End: 2019-05-24 | Stop reason: HOSPADM

## 2019-05-24 RX ORDER — HYDROMORPHONE HYDROCHLORIDE 1 MG/ML
1 INJECTION, SOLUTION INTRAMUSCULAR; INTRAVENOUS; SUBCUTANEOUS
Status: DISCONTINUED | OUTPATIENT
Start: 2019-05-24 | End: 2019-05-25 | Stop reason: HOSPADM

## 2019-05-24 RX ORDER — LIDOCAINE HYDROCHLORIDE 10 MG/ML
0.1 INJECTION INFILTRATION; PERINEURAL AS NEEDED
Status: DISCONTINUED | OUTPATIENT
Start: 2019-05-24 | End: 2019-05-24 | Stop reason: HOSPADM

## 2019-05-24 RX ORDER — MIDAZOLAM HYDROCHLORIDE 1 MG/ML
2 INJECTION, SOLUTION INTRAMUSCULAR; INTRAVENOUS
Status: DISCONTINUED | OUTPATIENT
Start: 2019-05-24 | End: 2019-05-24 | Stop reason: HOSPADM

## 2019-05-24 RX ORDER — LISINOPRIL 20 MG/1
40 TABLET ORAL EVERY EVENING
Status: DISCONTINUED | OUTPATIENT
Start: 2019-05-24 | End: 2019-05-25 | Stop reason: HOSPADM

## 2019-05-24 RX ORDER — HYDROCODONE BITARTRATE AND ACETAMINOPHEN 7.5; 325 MG/1; MG/1
1 TABLET ORAL
Qty: 28 TAB | Refills: 0 | Status: SHIPPED | OUTPATIENT
Start: 2019-05-24 | End: 2019-05-31

## 2019-05-24 RX ORDER — MIDAZOLAM HYDROCHLORIDE 1 MG/ML
INJECTION, SOLUTION INTRAMUSCULAR; INTRAVENOUS AS NEEDED
Status: DISCONTINUED | OUTPATIENT
Start: 2019-05-24 | End: 2019-05-24 | Stop reason: HOSPADM

## 2019-05-24 RX ORDER — NALOXONE HYDROCHLORIDE 0.4 MG/ML
0.1 INJECTION, SOLUTION INTRAMUSCULAR; INTRAVENOUS; SUBCUTANEOUS
Status: DISCONTINUED | OUTPATIENT
Start: 2019-05-24 | End: 2019-05-24 | Stop reason: HOSPADM

## 2019-05-24 RX ADMIN — SUCCINYLCHOLINE CHLORIDE 140 MG: 20 INJECTION INTRAMUSCULAR; INTRAVENOUS at 07:38

## 2019-05-24 RX ADMIN — GABAPENTIN 300 MG: 300 CAPSULE ORAL at 06:48

## 2019-05-24 RX ADMIN — HYDROCODONE BITARTRATE AND ACETAMINOPHEN 1 TABLET: 10; 325 TABLET ORAL at 20:36

## 2019-05-24 RX ADMIN — Medication 2 G: at 15:36

## 2019-05-24 RX ADMIN — DEXTROSE MONOHYDRATE, SODIUM CHLORIDE, AND POTASSIUM CHLORIDE 100 ML/HR: 50; 4.5; 1.49 INJECTION, SOLUTION INTRAVENOUS at 12:53

## 2019-05-24 RX ADMIN — SODIUM CHLORIDE, SODIUM LACTATE, POTASSIUM CHLORIDE, AND CALCIUM CHLORIDE 100 ML/HR: 600; 310; 30; 20 INJECTION, SOLUTION INTRAVENOUS at 06:47

## 2019-05-24 RX ADMIN — PRAVASTATIN SODIUM 20 MG: 20 TABLET ORAL at 21:42

## 2019-05-24 RX ADMIN — LIDOCAINE HYDROCHLORIDE 100 MG: 20 INJECTION, SOLUTION EPIDURAL; INFILTRATION; INTRACAUDAL; PERINEURAL at 07:37

## 2019-05-24 RX ADMIN — MIDAZOLAM HYDROCHLORIDE 2 MG: 1 INJECTION, SOLUTION INTRAMUSCULAR; INTRAVENOUS at 07:34

## 2019-05-24 RX ADMIN — EPHEDRINE SULFATE 10 MG: 50 INJECTION, SOLUTION INTRAVENOUS at 08:07

## 2019-05-24 RX ADMIN — Medication 1 AMPULE: at 21:42

## 2019-05-24 RX ADMIN — EPHEDRINE SULFATE 10 MG: 50 INJECTION, SOLUTION INTRAVENOUS at 08:12

## 2019-05-24 RX ADMIN — NEOSTIGMINE METHYLSULFATE 3 MG: 1 INJECTION INTRAVENOUS at 09:33

## 2019-05-24 RX ADMIN — KETAMINE HYDROCHLORIDE 50 MG: 100 INJECTION, SOLUTION INTRAMUSCULAR; INTRAVENOUS at 07:37

## 2019-05-24 RX ADMIN — GLYCOPYRROLATE 0.2 MG: 0.2 INJECTION INTRAMUSCULAR; INTRAVENOUS at 08:20

## 2019-05-24 RX ADMIN — Medication 1 AMPULE: at 12:54

## 2019-05-24 RX ADMIN — LIDOCAINE HYDROCHLORIDE ANHYDROUS AND DEXTROSE MONOHYDRATE 1 MG/KG/HR: .4; 5 INJECTION, SOLUTION INTRAVENOUS at 07:55

## 2019-05-24 RX ADMIN — OXYCODONE HYDROCHLORIDE 5 MG: 5 TABLET ORAL at 11:52

## 2019-05-24 RX ADMIN — FENTANYL CITRATE 50 MCG: 50 INJECTION, SOLUTION INTRAMUSCULAR; INTRAVENOUS at 08:15

## 2019-05-24 RX ADMIN — PROPOFOL 180 MG: 10 INJECTION, EMULSION INTRAVENOUS at 07:37

## 2019-05-24 RX ADMIN — SODIUM CHLORIDE, SODIUM LACTATE, POTASSIUM CHLORIDE, CALCIUM CHLORIDE: 600; 310; 30; 20 INJECTION, SOLUTION INTRAVENOUS at 07:52

## 2019-05-24 RX ADMIN — CHLORASEPTIC 1 SPRAY: 1.5 LIQUID ORAL at 17:48

## 2019-05-24 RX ADMIN — FAMOTIDINE 20 MG: 20 TABLET ORAL at 21:42

## 2019-05-24 RX ADMIN — ACETAMINOPHEN 1000 MG: 500 TABLET, FILM COATED ORAL at 06:48

## 2019-05-24 RX ADMIN — DEXAMETHASONE SODIUM PHOSPHATE 4 MG: 4 INJECTION, SOLUTION INTRA-ARTICULAR; INTRALESIONAL; INTRAMUSCULAR; INTRAVENOUS; SOFT TISSUE at 07:44

## 2019-05-24 RX ADMIN — HYDROMORPHONE HYDROCHLORIDE 1 MG: 1 INJECTION, SOLUTION INTRAMUSCULAR; INTRAVENOUS; SUBCUTANEOUS at 15:37

## 2019-05-24 RX ADMIN — KETAMINE HYDROCHLORIDE 25 MG: 100 INJECTION, SOLUTION INTRAMUSCULAR; INTRAVENOUS at 08:36

## 2019-05-24 RX ADMIN — LISINOPRIL 40 MG: 20 TABLET ORAL at 15:36

## 2019-05-24 RX ADMIN — ROCURONIUM BROMIDE 5 MG: 10 INJECTION, SOLUTION INTRAVENOUS at 07:37

## 2019-05-24 RX ADMIN — SODIUM CHLORIDE, SODIUM LACTATE, POTASSIUM CHLORIDE, CALCIUM CHLORIDE: 600; 310; 30; 20 INJECTION, SOLUTION INTRAVENOUS at 08:50

## 2019-05-24 RX ADMIN — ONDANSETRON 4 MG: 2 INJECTION INTRAMUSCULAR; INTRAVENOUS at 09:22

## 2019-05-24 RX ADMIN — Medication 10 ML: at 21:43

## 2019-05-24 RX ADMIN — EPHEDRINE SULFATE 10 MG: 50 INJECTION, SOLUTION INTRAVENOUS at 08:20

## 2019-05-24 RX ADMIN — HYDROMORPHONE HYDROCHLORIDE 0.5 MG: 2 INJECTION INTRAMUSCULAR; INTRAVENOUS; SUBCUTANEOUS at 10:47

## 2019-05-24 RX ADMIN — ROCURONIUM BROMIDE 35 MG: 10 INJECTION, SOLUTION INTRAVENOUS at 07:50

## 2019-05-24 RX ADMIN — AMLODIPINE BESYLATE 10 MG: 10 TABLET ORAL at 21:42

## 2019-05-24 RX ADMIN — EPHEDRINE SULFATE 20 MG: 50 INJECTION, SOLUTION INTRAVENOUS at 08:18

## 2019-05-24 RX ADMIN — Medication 2 G: at 08:01

## 2019-05-24 RX ADMIN — Medication 3 AMPULE: at 06:48

## 2019-05-24 RX ADMIN — FENTANYL CITRATE 100 MCG: 50 INJECTION, SOLUTION INTRAMUSCULAR; INTRAVENOUS at 07:37

## 2019-05-24 RX ADMIN — PROPOFOL 20 MG: 10 INJECTION, EMULSION INTRAVENOUS at 09:36

## 2019-05-24 RX ADMIN — HYDROCODONE BITARTRATE AND ACETAMINOPHEN 1 TABLET: 10; 325 TABLET ORAL at 13:57

## 2019-05-24 RX ADMIN — GLYCOPYRROLATE 0.4 MG: 0.2 INJECTION INTRAMUSCULAR; INTRAVENOUS at 09:33

## 2019-05-24 NOTE — PROGRESS NOTES
TRANSFER - IN REPORT:    Verbal report received from HUAN Deng on Denisha Wall  being received from PACU for routine post - op      Report consisted of patients Situation, Background, Assessment and   Recommendations(SBAR). Information from the following report(s) SBAR, OR Summary and MAR was reviewed with the receiving nurse. Opportunity for questions and clarification was provided. Assessment completed upon patients arrival to unit and care assumed.

## 2019-05-24 NOTE — BRIEF OP NOTE
BRIEF OPERATIVE NOTE    Date of Procedure: 5/24/2019   Preoperative Diagnosis: Lumbar stenosis with neurogenic claudication [M48.062]  Postoperative Diagnosis: Lumbar stenosis with neurogenic claudication [M48.062]    Procedure(s):  L2-L5 SPINE LUMBAR LAMINECTOMY   Surgeon(s) and Role:     Herlinda Strauss MD - Primary         Surgical Assistant: staff    Surgical Staff:  Circ-1: Galindo Silva RN  Circ-Relief: Sania Lewis RN  Radiology Technician: Babak Gil, RT, R, CT  Scrub Tech-1: Ana Cristina MESSINA  Scrub Tech-2: Roman Leiva  Scrub Tech-3: Marty Black  Event Time In Time Out   Incision Start 0809    Incision Close       Anesthesia: General   Estimated Blood Loss: 200cc  Specimens: * No specimens in log *   Findings: stenosis   Complications: none  Implants: * No implants in log *

## 2019-05-24 NOTE — PROGRESS NOTES
05/24/19 1214   Dual Skin Pressure Injury Assessment   Dual Skin Pressure Injury Assessment WDL   Second Care Provider (Based on [address] [place] [address]) Hailey Cheney RN   Skin Integumentary   Skin Integumentary (WDL) X   Skin Condition/Temp Warm;Dry   Skin Color Appropriate for ethnicity   Skin Integrity Incision (comment); Tattoos (comment)  (back)   Turgor Non-tenting   Wound Prevention and Protection Methods   Orientation of Wound Prevention Posterior   Location of Wound Prevention Sacrum/Coccyx   Dressing Present  No   Wound Offloading (Prevention Methods) Bed, pressure reduction mattress   Dual skin assessment performed by primary RN and Hailey Cheney RN. Dressing to mid back, CDI. Tattoos to BUE.

## 2019-05-24 NOTE — ANESTHESIA PREPROCEDURE EVALUATION
Anesthetic History   No history of anesthetic complications            Review of Systems / Medical History  Patient summary reviewed and pertinent labs reviewed    Pulmonary    COPD: mild    Sleep apnea: No treatment      Pertinent negatives: Smoker: Former smoker. Quit 20 years ago. Neuro/Psych         Psychiatric history (Depression)     Cardiovascular    Hypertension: well controlled        Dysrhythmias (EKG showing RBBB)   PAD (Stent in R lower extremity) and hyperlipidemia    Exercise tolerance: >4 METS: Exercises regularly on rowing machine at gym. Comments: Pt denies recent CP, SOB or Palpitations   GI/Hepatic/Renal     GERD: well controlled    Renal disease: CRI       Endo/Other        Arthritis     Other Findings            Physical Exam    Airway  Mallampati: II  TM Distance: 4 - 6 cm  Neck ROM: normal range of motion   Mouth opening: Normal     Cardiovascular  Regular rate and rhythm,  S1 and S2 normal,  no murmur, click, rub, or gallop             Dental    Dentition: Full upper dentures     Pulmonary  Breath sounds clear to auscultation               Abdominal  GI exam deferred       Other Findings            Anesthetic Plan    ASA: 3  Anesthesia type: general          Induction: Intravenous  Anesthetic plan and risks discussed with: Patient and Spouse      Plan for multi-modal analgesia with preop Gabapentin/Tylenol, intraop Ketamine/Lidocaine.

## 2019-05-24 NOTE — PROGRESS NOTES
Chart screened by  for discharge planning. No needs identified at this time however PT evaluation pending. Please consult  if any new issues arise.     Care Management Interventions  Transition of Care Consult (CM Consult): Discharge Planning

## 2019-05-24 NOTE — ANESTHESIA POSTPROCEDURE EVALUATION
Procedure(s):  L2-L5 SPINE LUMBAR LAMINECTOMY . general    Anesthesia Post Evaluation      Multimodal analgesia: multimodal analgesia used between 6 hours prior to anesthesia start to PACU discharge  Patient location during evaluation: PACU  Patient participation: complete - patient participated  Level of consciousness: awake  Pain management: adequate  Airway patency: patent  Anesthetic complications: no  Cardiovascular status: acceptable  Respiratory status: spontaneous ventilation and acceptable  Hydration status: acceptable  Post anesthesia nausea and vomiting:  none      Vitals Value Taken Time   /82 5/24/2019 11:14 AM   Temp 37.1 °C (98.8 °F) 5/24/2019 10:02 AM   Pulse 91 5/24/2019 11:15 AM   Resp 14 5/24/2019 10:02 AM   SpO2 95 % 5/24/2019 11:15 AM   Vitals shown include unvalidated device data.

## 2019-05-24 NOTE — PERIOP NOTES
TRANSFER - OUT REPORT:    Verbal report given to Patsy(name) on Daniela Sensing  being transferred to 732(unit) for routine post - op       Report consisted of patients Situation, Background, Assessment and   Recommendations(SBAR). Information from the following report(s) SBAR, OR Summary, Procedure Summary, Intake/Output, MAR and Cardiac Rhythm NSR was reviewed with the receiving nurse. Lines:   Peripheral IV 05/24/19 Left;Posterior Hand (Active)   Site Assessment Clean, dry, & intact; Ecchymotic (bruised) 5/24/2019  6:46 AM   Dressing Status Clean, dry, & intact; New 5/24/2019  6:46 AM   Dressing Type Tape;Transparent 5/24/2019  6:46 AM   Hub Color/Line Status Green; Infusing 5/24/2019  6:46 AM       Peripheral IV 05/24/19 Right Forearm (Active)        Opportunity for questions and clarification was provided. Patient transported with:   O2 @ 2 liters  Tech    VTE prophylaxis orders have been written for Daniela Sensing. Patient and family given floor number and nurses name. Family updated re: pt status after security code verified.

## 2019-05-25 VITALS
DIASTOLIC BLOOD PRESSURE: 85 MMHG | BODY MASS INDEX: 28.23 KG/M2 | HEART RATE: 89 BPM | RESPIRATION RATE: 17 BRPM | TEMPERATURE: 98.4 F | HEIGHT: 72 IN | SYSTOLIC BLOOD PRESSURE: 147 MMHG | WEIGHT: 208.4 LBS | OXYGEN SATURATION: 91 %

## 2019-05-25 PROCEDURE — 97161 PT EVAL LOW COMPLEX 20 MIN: CPT

## 2019-05-25 PROCEDURE — 97530 THERAPEUTIC ACTIVITIES: CPT

## 2019-05-25 PROCEDURE — 74011250636 HC RX REV CODE- 250/636: Performed by: ORTHOPAEDIC SURGERY

## 2019-05-25 PROCEDURE — 99218 HC RM OBSERVATION: CPT

## 2019-05-25 PROCEDURE — 74011250637 HC RX REV CODE- 250/637: Performed by: ORTHOPAEDIC SURGERY

## 2019-05-25 RX ADMIN — Medication 2 G: at 00:34

## 2019-05-25 RX ADMIN — Medication 2 G: at 08:08

## 2019-05-25 RX ADMIN — FAMOTIDINE 20 MG: 20 TABLET ORAL at 08:08

## 2019-05-25 RX ADMIN — PANTOPRAZOLE SODIUM 40 MG: 40 TABLET, DELAYED RELEASE ORAL at 05:22

## 2019-05-25 RX ADMIN — Medication 10 ML: at 05:22

## 2019-05-25 RX ADMIN — HYDROCODONE BITARTRATE AND ACETAMINOPHEN 1 TABLET: 10; 325 TABLET ORAL at 08:08

## 2019-05-25 RX ADMIN — Medication 1 AMPULE: at 08:08

## 2019-05-25 RX ADMIN — METOPROLOL SUCCINATE 25 MG: 25 TABLET, EXTENDED RELEASE ORAL at 08:08

## 2019-05-25 RX ADMIN — HYDROCODONE BITARTRATE AND ACETAMINOPHEN 1 TABLET: 10; 325 TABLET ORAL at 03:01

## 2019-05-25 NOTE — OP NOTES
[facility]  OPERATIVE REPORT    Name:  Hermila Oropeza  MR#:  [id number]  :  1955  ACCOUNT #:  [de-identified]  DATE OF SERVICE:  2019    PREOPERATIVE DIAGNOSIS:  Lumbar spinal stenosis L2-L5. POSTOPERATIVE DIAGNOSIS:  Lumbar spinal stenosis L2-L5. PROCEDURE PERFORMED:  Bilateral L2, L3, L4 and L5 laminectomy with partial facetectomy and foraminotomy, CPT code 79585 and 71295 x3. SURGEON:  Yin Anthony MD    ASSISTANT:  Staff. ANESTHESIA:  GETA. COMPLICATIONS:  None. SPECIMENS REMOVED:  None. IMPLANTS:  None. ESTIMATED BLOOD LOSS:  200 mL. FLUIDS:  1 L of crystalloid. DRAINS:  One small Hemovac. COMPLICATIONS:  None. INDICATIONS:  The patient is a 49-year-old gentleman who has had longstanding degenerative disk disease with spinal stenosis causing leg and back pain and claudication symptoms. He initially did well with activity restriction and epidural steroid injections, but that is no longer addressing his pain. He is limited on his activity, so he is here for surgical treatment. PROCEDURE:  The patient was brought to operating room after administration of anesthesia, IV antibiotics, and placement of monitoring lines. He was positioned prone on the [facility] frame with a sling. His back was prepped with Betadine and sterilely draped. At this point, surgeon called a time-out and confirmed the procedure to be performed, his allergy history, and the fact he had received preoperative IV antibiotics. C-arm was brought in and we identified the L3-4 and L4-5 level, marked them on the skin, then made a midline incision over this extending it up further cephalad. We dissected down through the skin and subcutaneous tissue with electrocautery to the deep fascia. Deep fascia was incised on either side of spinous processes.   We dissected out along the lamina out to the facet joint and placed an angled curette in what was felt to be the L4-5 interlaminar space, took a lateral C-arm x-ray to confirm our location. The patient had very degenerative spine, was very stiff, and non-mobile. The facet joints were notably hypertrophic. We used a rongeur to remove the spinous processes of L2, L3, L4 and L5. We burred down the lamina with a high-speed bur from L2-L5 then used a Kerrison to perform a bilateral L2, L3, L4 and L5 laminectomy. We undercut the facet joints and we performed foraminotomies. The dura expanded nicely. We removed all the ligamentum flavum. The worst stenosis actually appeared to be L4-5. When we had completed this, the thecal sac had expanded nicely. Foramen were free. There was only a small to moderate amount of epidural bleeding, which we cauterized with the bipolar. We thoroughly irrigated the wound, suctioned it dry. We released tension on the retractors and with the muscle bleeding, we tried to cauterize that as well. Overall, there was some venous oozing, so we elected to place a small Hemovac drain in the wound, brought it out through a separate proximal stab incision. We placed FloSeal over the decompression site for hemostasis. We reapproximated the deep fascia with interrupted figure-of-eight stitches of #1 Vicryl. Subcutaneous tissue was anesthetized with a total of 30 mL of 0.5% Marcaine with epinephrine. We closed the subcutaneous tissue with interrupted stitches of 2-0 Vicryl and skin was closed with a running subcuticular stitch of 3-0 Vicryl. Steri-Strips, dry sterile dressings were applied. The patient was rolled supine onto the recovery room bed having tolerated the procedure well.         Grandville Paget, MD [id number]  D:  05/24/2019 10:05  T:  05/24/2019 10:19  JOB #:  [id number]  CC:  Oral Stoll MD

## 2019-05-25 NOTE — PROGRESS NOTES
Orthopaedic Surgery Progress Note     Zaina Landis   male   1955     Patient Active Problem List    Diagnosis Date Noted    Spinal stenosis of lumbar region at multiple levels 05/24/2019    Spinal stenosis 05/24/2019    COPD, mild ([facility] [address] 11/28/2017    Restrictive lung disease 11/28/2017    JENNIFER (obstructive sleep apnea) 11/28/2017    Status post total replacement of left shoulder 12/20/2016    GERD (gastroesophageal reflux disease) 09/26/2016    Arthritis of left shoulder region 03/22/2016    Esophageal stricture 03/22/2016    Dyslipidemia 03/22/2016    Arthritis, multiple joint involvement 03/22/2016    Low serum testosterone level 03/22/2016    Impotence of organic origin 03/22/2016    Benign non-nodular prostatic hyperplasia with lower urinary tract symptoms 03/22/2016    Hypertension 03/22/2016    History of colonic polyps 03/22/2016    Benign tumor of skin of leg 03/22/2016    Abnormal EKG 03/22/2016    HTN (hypertension) 03/12/2013       Procedure: 1 Day Post-Op Procedure(s):  L2-L5 SPINE LUMBAR LAMINECTOMY    Attending Surgeon: Nellie Alfonso MD     Subjective:   Pain well controlled, no acute distress. Objective:   Afebrile, vital signs stable.    Visit Vitals  /85 (BP 1 Location: Right arm, BP Patient Position: Sitting)   Pulse 89   Temp 98.4 °F (36.9 °C)   Resp 17   Ht 6' (1.829 m)   Wt 94.5 kg (208 lb 6.4 oz)   SpO2 91%   BMI 28.26 kg/m²          EXTREMITIES:       No deficits on BL LE  Good pulses   Dressing CDI       CBC:  No results found for: WBC, RBC, HGB, HCT, PLT, HGBEXT, HCTEXT, PLTEXT   BMP: No results found for: NA, K, CL, CO2, BUN, GLU   CMP: No results found for: NA, K, CL, CO2, BUN, GLU, ALBUMIN, ALT     Assessment/Plan     75 y.o. male with the following musculoskeletal problems:     1 Day Post-Op Procedure(s):  L2-L5 SPINE [address] today        Eliana Ty MD  05/25/19  9:48 AM    Orthopaedic Hand Surgery    [address] [place] [address] [facility]  Phone: [phone]

## 2019-05-25 NOTE — DISCHARGE INSTRUCTIONS
Lumbar Laminectomy for Spinal Stenosis: What to Expect at [address] can expect your back to feel stiff or sore after surgery. This should improve in the weeks after surgery. You may have trouble sitting or standing in one position for very long and may need pain medicine in the weeks after your surgery. Your doctor may advise you to work with a physical therapist to strengthen the muscles around your spine and trunk. You will need to learn how to lift, twist, and bend so that you do not put too much strain on your back. This care sheet gives you a general idea about how long it will take for you to recover. But each person recovers at a different pace. Follow the steps below to get better as quickly as possible. How can you care for yourself at home? Activity  · Rest when you feel tired. Getting enough sleep will help you recover. · Try to walk each day. Start by walking a little more than you did the day before. Bit by bit, increase the amount you walk. Walking boosts blood flow and helps prevent pneumonia and constipation. Walking may also decrease your muscle soreness after surgery. · If advised by your doctor, you may need to avoid lifting anything that would cause excessive strain on your back. This may include a child, heavy grocery bags and milk containers, a heavy briefcase or backpack, cat litter or dog food bags, or a vacuum . · Avoid strenuous activities, such as bicycle riding, jogging, weight lifting, or aerobic exercise, until your doctor says it is okay. · Do not drive for 2 to 4 weeks after your surgery or until your doctor says it is okay. · Avoid riding in a car for more than 30 minutes at a time for 2 to 4 weeks after surgery. If you must ride in a car for a longer distance, stop often to walk and stretch your legs. · Try to change your position about every 30 minutes while sitting or standing. This will help decrease your back pain while you are healing.   · You will probably need to take 4 to 6 weeks off from work. It depends on the type of work you do and how you feel. · You may have sex as soon as you feel able, but avoid positions that put stress on your back or cause pain. Diet  · You can eat your normal diet. If your stomach is upset, try bland, low-fat foods like plain rice, broiled chicken, toast, and yogurt. · Drink plenty of fluids (unless your doctor tells you not to). · You may notice that your bowel movements are not regular right after your surgery. This is common. Try to avoid constipation and straining with bowel movements. You may want to take a fiber supplement every day. If you have not had a bowel movement after a couple of days, ask your doctor about taking a mild laxative. Medicines  · Take pain medicines exactly as directed. ¨ If the doctor gave you a prescription medicine for pain, take it as prescribed. ¨ If you are not taking a prescription pain medicine, ask your doctor if you can take an over-the-counter medicine. · If your doctor prescribed antibiotics, take them as directed. Do not stop taking them just because you feel better. You need to take the full course of antibiotics. · If you think your pain medicine is making you sick to your stomach:  ¨ Take your medicine after meals (unless your doctor has told you not to). ¨ Ask your doctor for a different pain medicine. Incision care  · If you have strips of tape on the cut (incision) the doctor made, leave the tape on for a week or until it falls off. · Wash the area daily with warm, soapy water and pat it dry. · Keep the area clean and dry. You may cover it with a gauze bandage if it weeps or rubs against clothing. Change the bandage every day. Exercise  · Do back exercises as instructed by your doctor. · Your doctor may advise you to work with a physical therapist to improve the strength and flexibility of your back.   Other instructions  · Use a heating pad, a warm water bottle, or a warm cloth on your back to reduce stiffness. Do not go to sleep with a heating pad on your skin. Put a thin cloth between the heating pad and your skin. Follow-up care is a key part of your treatment and safety. Be sure to make and go to all appointments, and call your doctor if you are having problems. Its also a good idea to know your test results and keep a list of the medicines you take. When should you call for help? Call 911 anytime you think you may need emergency care. For example, call if:  · You passed out (lost consciousness). · You have sudden chest pain and shortness of breath, or you cough up blood. · You lose bladder or bowel control. · One or both legs suddenly feel weak or numb. Call your doctor now or seek immediate medical care if:  · You have pain that does not get better after you take pain pills. · You have a headache that does not get better when you take medicine for it. · You have loose stitches, or your incision comes open. · You have blood or fluid draining from the incision. · You have signs of infection, such as:  ¨ Increased pain, swelling, warmth, or redness. ¨ Pus draining from the incision. ¨ A fever. ¨ Red streaks leading from the incision. Watch closely for any changes in your health, and be sure to contact your doctor if:  · You have new numbness or tingling in your legs. · You have new pain or weakness in your legs. · You do not have a bowel movement after taking a laxative. Where can you learn more? Go to [employer]  Enter H370 in the search box to learn more about \"Lumbar Laminectomy for Spinal Stenosis: What to Expect at Home. \"   © 8772-8547 [employer]. Care instructions adapted under license by [employer] (which disclaims liability or warranty for this information).  This care instruction is for use with your licensed healthcare professional. If you have questions about a medical condition or this instruction, always ask your healthcare professional. [address] any warranty or liability for your use of this information. Content Version: 3.6.398995; Last Revised: March 13, 2012          DISCHARGE SUMMARY from Nurse    PATIENT INSTRUCTIONS:    After general anesthesia or intravenous sedation, for 24 hours or while taking prescription Narcotics:  · Limit your activities  · Do not drive and operate hazardous machinery  · Do not make important personal or business decisions  · Do  not drink alcoholic beverages  · If you have not urinated within 8 hours after discharge, please contact your surgeon on call. Report the following to your surgeon:  · Excessive pain, swelling, redness or odor of or around the surgical area  · Temperature over 100.5  · Nausea and vomiting lasting longer than 4 hours or if unable to take medications  · Any signs of decreased circulation or nerve impairment to extremity: change in color, persistent  numbness, tingling, coldness or increase pain  · Any questions    What to do at Home:  Recommended activity: See surgical instructions. *  Please give a list of your current medications to your Primary Care Provider. *  Please update this list whenever your medications are discontinued, doses are      changed, or new medications (including over-the-counter products) are added. *  Please carry medication information at all times in case of emergency situations. These are general instructions for a healthy lifestyle:    No smoking/ No tobacco products/ Avoid exposure to second hand smoke  Surgeon General's Warning:  Quitting smoking now greatly reduces serious risk to your health.     Obesity, smoking, and sedentary lifestyle greatly increases your risk for illness    A healthy diet, regular physical exercise & weight monitoring are important for maintaining a healthy lifestyle    You may be retaining fluid if you have a history of heart failure or if you experience any of the following symptoms:  Weight gain of 3 pounds or more overnight or 5 pounds in a week, increased swelling in our hands or feet or shortness of breath while lying flat in bed. Please call your doctor as soon as you notice any of these symptoms; do not wait until your next office visit. Recognize signs and symptoms of STROKE:    F-face looks uneven    A-arms unable to move or move unevenly    S-speech slurred or non-existent    T-time-call 911 as soon as signs and symptoms begin-DO NOT go       Back to bed or wait to see if you get better-TIME IS BRAIN. Warning Signs of HEART ATTACK     Call 911 if you have these symptoms:   Chest discomfort. Most heart attacks involve discomfort in the center of the chest that lasts more than a few minutes, or that goes away and comes back. It can feel like uncomfortable pressure, squeezing, fullness, or pain.  Discomfort in other areas of the upper body. Symptoms can include pain or discomfort in one or both arms, the back, neck, jaw, or stomach.  Shortness of breath with or without chest discomfort.  Other signs may include breaking out in a cold sweat, nausea, or lightheadedness. Don't wait more than five minutes to call 911 - MINUTES MATTER! Fast action can save your life. Calling 911 is almost always the fastest way to get lifesaving treatment. Emergency Medical Services staff can begin treatment when they arrive -- up to an hour sooner than if someone gets to the hospital by car. The discharge information has been reviewed with the patient. The patient verbalized understanding. Discharge medications reviewed with the patient and appropriate educational materials and side effects teaching were provided.   ___________________________________________________________________________________________________________________________________

## 2019-05-25 NOTE — PROGRESS NOTES
PHYSICAL THERAPY: Initial Assessment, Discharge and AM 5/25/2019  OBSERVATION: PT Visit Days : 1  [place] / [place] / Product Type: Medicare /       NAME/AGE/GENDER: Xavier Coronado is a 75 y.o. male   PRIMARY DIAGNOSIS: Lumbar stenosis with neurogenic claudication [M48.062]  Spinal stenosis [M48.00] Spinal stenosis of lumbar region at multiple levels   Spinal stenosis of lumbar region at multiple levels    Procedure(s) (LRB):  L2-L5 SPINE LUMBAR LAMINECTOMY  (N/A)  1 Day Post-Op  ICD-10: Treatment Diagnosis:    Generalized Muscle Weakness (M62.81)  Difficulty in walking, Not elsewhere classified (R26.2)   Precaution/Allergies:  Ibuprofen and Nsaids (non-steroidal anti-inflammatory drug)      ASSESSMENT:     Mr. Ricky Mccullough presents sitting EOB dressed at arrival expecting to go home. He was willing to participate. He knows his precautions, but violated twisting x2 when attempting to gather data. He was able to stand IND, ambulate 400ft with no AD, no balance challenges, returned to EOB. Main limitation is impulsiveness. Pt will be discharged from PT at this time due to pt having dc order at time of this note. In addition, pt is IND with all trnfs and ambulation. This section established at most recent assessment   PROBLEM LIST (Impairments causing functional limitations):  NA    INTERVENTIONS PLANNED: (Benefits and precautions of physical therapy have been discussed with the patient.)  NA      TREATMENT PLAN: Frequency/Duration: 1 time a week for duration of hospital stay  Rehabilitation Potential For Stated Goals: Excellent     REHAB RECOMMENDATIONS (at time of discharge pending progress):    Placement: It is my opinion, based on this patient's performance to date, that Mr. Ricky Mccullough may benefit from being discharged with NO further skilled therapy due to a proven ability to function at baseline and the high likelihood of returning to baseline.   Equipment:   None at this time HISTORY:   History of Present Injury/Illness (Reason for Referral):  DATE OF SERVICE:  05/24/2019     PREOPERATIVE DIAGNOSIS:  Lumbar spinal stenosis L2-L5. POSTOPERATIVE DIAGNOSIS:  Lumbar spinal stenosis L2-L5. PROCEDURE PERFORMED:  Bilateral L2, L3, L4 and L5 laminectomy with partial facetectomy and foraminotomy, CPT code 88549 and 59943 x3. Past Medical History/Comorbidities:   Mr. Robert Smith  has a past medical history of Acute kidney failure, unspecified ([facility] [address] RADHA (acute kidney injury) ([facility] [address] (3/12/2013), Arthritis of left shoulder region (left), BBB (bundle branch block) (09/2017), Cancer ([facility] [address] COPD, mild ([facility] [address] (11/28/2017), Depression, Dyslipidemia, GERD (gastroesophageal reflux disease), Hemangioma of skin, History of esophageal dilatation, History of fracture of foot (right), HTN (hypertension), Hypertonicity of bladder, Hypertrophy of prostate with urinary obstruction and other lower urinary tract symptoms (LUTS), Impotence of organic origin, Leg pain, Low serum testosterone level, JENNIFER (obstructive sleep apnea) (11/28/2017), Osteoarthrosis, unspecified whether generalized or localized, unspecified site, and Unspecified disorder of kidney and ureter (2008). Mr. Robert Smith  has a past surgical history that includes hx tumor removal (Left, 50 years ago); hx colonoscopy; hx endoscopy; hx heent; hx other surgical (Left, 08/28/2016); hx shoulder arthroscopy (Left); hx shoulder replacement (Left, 2015); and hx orthopaedic (Right, 2016). Social History/Living Environment:   Home Environment: Private residence  One/Two Story Residence: One story  Living Alone: No  Support Systems: Spouse/Significant Other/Partner  Patient Expects to be Discharged to[de-identified] Private residence  Current DME Used/Available at Home: None  Prior Level of Function/Work/Activity:  Pt completely IND, no AD, drives, retired.       Number of Personal Factors/Comorbidities that affect the Plan of Care: 0: LOW COMPLEXITY   EXAMINATION: Most Recent Physical Functioning:   Gross Assessment:  AROM: Within functional limits  Strength: Within functional limits  Coordination: Within functional limits  Tone: Normal  Sensation: Intact               Posture:     Balance:  Sitting: Intact  Standing: Intact Bed Mobility:  Rolling: Independent  Supine to Sit: Independent  Sit to Supine: Independent  Scooting: Independent  Wheelchair Mobility:     Transfers:  Sit to Stand: Independent  Stand to Sit: Independent  Gait:     Distance (ft): 400 Feet (ft)  Ambulation - Level of Assistance: Supervision      Body Structures Involved:  Bones  Joints  Muscles  Ligaments Body Functions Affected:  Neuromusculoskeletal  Movement Related Activities and Participation Affected:  General Tasks and Demands  Mobility  [address] [place] [address] [employer]   Number of elements that affect the Plan of Care: 1-2: LOW COMPLEXITY   CLINICAL PRESENTATION:   Presentation: Stable and uncomplicated: LOW COMPLEXITY   CLINICAL DECISION MAKIN [address]? ?6 Clicks? Basic Mobility Inpatient Short Form  How much difficulty does the patient currently have. .. Unable A Lot A Little None   1. Turning over in bed (including adjusting bedclothes, sheets and blankets)? ? 1   ? 2   ? 3   ? 4   2. Sitting down on and standing up from a chair with arms ( e.g., wheelchair, bedside commode, etc.)   ? 1   ? 2   ? 3   ? 4   3. Moving from lying on back to sitting on the side of the bed?   ? 1   ? 2   ? 3   ? 4   How much help from another person does the patient currently need. .. Total A Lot A Little None   4. Moving to and from a bed to a chair (including a wheelchair)? ? 1   ? 2   ? 3   ? 4   5. Need to walk in hospital room? ? 1   ? 2   ? 3   ? 4   6. Climbing 3-5 steps with a railing? ? 1   ? 2   ? 3   ? 4   © 2007, Trustees of [address] [place] [address] [address] [id number], under license to [employer].  All rights reserved      Score:  Initial: 24 Most Recent: X (Date: -- )    Interpretation of Tool:  Represents activities that are increasingly more difficult (i.e. Bed mobility, Transfers, Gait). Medical Necessity:     NA.  Reason for Services/Other Comments:  NA.   Use of outcome tool(s) and clinical judgement create a POC that gives a: Clear prediction of patient's progress: LOW COMPLEXITY            TREATMENT:   (In addition to Assessment/Re-Assessment sessions the following treatments were rendered)   Pre-treatment Symptoms/Complaints:  none  Pain: Initial:   Pain Intensity 1: 0  Post Session:  0     Therapeutic Activity: (    8min): Therapeutic activities including Bed transfers and Ambulation on level ground to improve mobility, strength, balance and coordination. Required minimal   to promote static and dynamic balance in standing, promote coordination of bilateral, lower extremity(s) and promote motor control of bilateral, lower extremity(s). Braces/Orthotics/Lines/Etc:   O2 Device: Nasal cannula  Treatment/Session Assessment:    Response to Treatment:  see above  Interdisciplinary Collaboration:   Physical Therapist  Registered Nurse  After treatment position/precautions:   EOB    Compliance with Program/Exercises: Compliant most of the time  Recommendations/Intent for next treatment session: \"Next visit will focus on NA \".   Total Treatment Duration:  PT Patient Time In/Time Out  Time In: 0915  Time Out: [id number] [facility]

## 2019-05-25 NOTE — PROGRESS NOTES
Discharge instructions, follow up information, medication list, and Norco prescription provided and explained to the pt. IV removed from right and left arm, no remote telemetry on. Primary RN to address drain. Opportunity for questions provided. Instructed to call once ready to leave.

## 2019-05-25 NOTE — PROGRESS NOTES
Pt is for discharge home today with no needs/supportive care orders recieved for CM at this time.   Care Management Interventions  Transition of Care Consult (CM Consult): Discharge Planning, LTAC  Discharge Durable Medical Equipment: No  Physical Therapy Consult: Yes  Occupational Therapy Consult: No  Speech Therapy Consult: No  Discharge Location  Discharge Placement: Home

## 2019-07-10 ENCOUNTER — HOSPITAL ENCOUNTER (OUTPATIENT)
Dept: PHYSICAL THERAPY | Age: 64
Discharge: HOME | End: 2019-07-10
Payer: MEDICARE

## 2019-07-10 PROCEDURE — 97161 PT EVAL LOW COMPLEX 20 MIN: CPT

## 2019-07-10 NOTE — THERAPY EVALUATION
Margie Navarro  : 1955  [place] / [place] / Product Type: Medicare /  [address]  [facility], [address]  [phone]   [phone]                OUTPATIENT PHYSICAL THERAPY:Initial Assessment 7/10/2019    ICD-10: Treatment Diagnosis:  Low back pain (M54.5)  Difficulty in walking, not elsewhere classified (R26.2)  Muscle weakness (generalized) (M62.81)        PRECAUTIONS/ALLERGIES:   Ibuprofen and Nsaids (non-steroidal anti-inflammatory drug)     FALL RISK SCORE: 1 (? 5 = High Risk)    MD ORDERS: Eval and Treat  MEDICAL/REFERRING DIAGNOSIS:  s/p back surgery     DATE OF ONSET: May 24, 2019 s/p bilateral L2, L3, L4 and L5 laminectomy with partial facetectomy and foraminotomy    REFERRING PHYSICIAN: Samuel Molina MD    RETURN PHYSICIAN APPOINTMENT: TBD by patient      Ambulatory/Rehab Services H2 Model Falls Risk Assessment    Risk Factors:       (1)  Gender [Male] Ability to Rise from Chair:       (0)  Ability to rise in a single movement    Falls Prevention Plan:       No modifications necessary   Total: (5 or greater = High Risk): 1     [facility] of [employer]. All Rights Reserved. [facility] States Patent #[id number]. Federal Law prohibits the replication, distribution or use without written permission from [address] [place]:  Mr. Margie Navarro has attended 1 physical therapy session including initial evaluation as of 7/10/2019. Margie Navarro exhibits decreased flexibility, increased pain, decreased postural and core strength, decreased functional tolerance, and decreased general LE strength s/p bilateral L2, L3, L4 and L5 laminectomy with partial facetectomy and foraminotomy. No pelvic malalignment upon initial evaluation but decreased posture. Significant decreased fascial mobility around incision especially distally.  Overall pt is doing well but overdoing it at times when painting his front steps and cutting his grass. His biggest complaint is stiffness. Camille Mera will benefit from skilled PT (medically necessary) to address above deficits affecting participation in basic ADLs and overall functional tolerance. Manual techniques (stretching, joint mobilizations, soft tissue mobilization/myofascial release), postural exercises/education, therapeutic techniques/activities, and HEP will be performed as appropriate addressing Tanya Ge's current condition. PROBLEM LIST (Impacting functional limitations):  1. Decreased Strength  2. Decreased ADL/Functional Activities  3. Decreased Transfer Abilities  4. Decreased Ambulation Ability/Technique  5. Decreased Balance  6. Increased Pain  7. Decreased Activity Tolerance  8. Increased Fatigue  9. Increased Shortness of Breath  10. Decreased Flexibility/Joint Mobility  11. Decreased [place] with Home Exercise Program INTERVENTIONS PLANNED:  1. Balance Exercise  2. Bed Mobility  3. Cold  4. Cryotherapy  5. Electrical Stimulation  6. Family Education  7. Gait Training  8. Heat  9. Home Exercise Program (HEP)  10. Manual Therapy  11. Neuromuscular Re-education/Strengthening  12. Range of Motion (ROM)  13. Therapeutic Activites  14. Therapeutic Exercise/Strengthening  15. Transfer Training  16. Mechanical traction  17. Aquatic Therapy  18. Electrical Stimulation   TREATMENT PLAN:  Effective Dates: 7/10/2019 TO 10/8/2019 (90 days). Frequency/Duration: 2 times a week for 90 Days    GOALS: (Goals have been discussed and agreed upon with patient.)  Short Term Goals 4 weeks   1. Camille Mera will be independent with HEP to promote self-management of symptoms. 2. Camille Mera will participate in LE stretching program to increase hamstring flexibility for facilitation of ADL performance.   David will participate in core stabilization exercises to help with stabilization and improve posture during ADLs to help prevent future injuries. [place] will participate in LE strengthening program with weights as appropriate to help with gait and elevations. Corinar Mila will participate in static and dynamic balance activities to decrease the risk for falls and improve overall QOL. Corinar Mila will tolerate manual therapy/joint mobilizations/soft tissue to increase ROM and decrease pain to improve functional mobility during ADLs. Long Term Goals 12 weeks   1. Reginald Barrera will demonstrate an 5 point improvement on the Oswestry/LEFs to show improvement in function. 2. Reginald Barrera will report <=2/10 pain at rest and during ADLs to improve QOL. Corinar Mila will demonstrate >=4+/5 LE strength on manual muscle testing to improve functional mobility. Corinar Mila will be able to demonstrate safe lifting and transfer mechanics without cueing for improved safety with home, childcare, and community activities. Rehabilitation Potential For Stated Goals: GOOD    Regarding Candido Natalie Ge's therapy, I certify that the treatment plan above will be carried out by a therapist or under their direction. Thank you for this referral,  Sonal Kellogg, PT, DPT       Referring Physician Signature: Brendan Gandhi MD              Date                    HISTORY:   PATIENT GOAL FOR PHYSICAL THERAPY:  Pt notes he would like to decrease stiffness in his lower back. HISTORY OF PRESENT INJURY/ILLNESS (REASON FOR REFERRAL):  Pt notes he was significant pain in lower back which is why he elected to have surgery May 24. Pt notes he has been going to the [employer] to exercise and is doing the elliptical machine for 20min. He states he is aware of his precautions but notes MD said he did not have to abide by them any more. Pt notes his biggest complaint is the stiffness and achyness in his lower back. He describes the pain as a cramping sensation in his lower back.  Pt notes the stiffness can get as high as 6/10 and as low as 0/10. Pt notes the most difficulty don/doffing socks and shoes, cutting the grass, getting out of bed. Note: Patient denies any increase of symptoms with cough, sneeze or valsalva. Patient denies any saddle paresthesia or bowel/bladder deficits. KEY NOTES FOR HISTORY Date: 7/10/2019   Mechanism of injury S/p surgery   Prior history of low back pain yes   Leg pain no   Fear, depression, anxiety no   Behavior of condition acute   Irritability low   Previous failed treatments no   Sedentary lifestyle no       PAST MEDICAL HISTORY/COMORBIDITIES:   Mr. Shyann Roblero  has a past medical history of Acute kidney failure, unspecified ([facility] RADHA (acute kidney injury) ([facility] [address] [facility] (3/12/2013), Arthritis of left shoulder region (left), BBB (bundle branch block) (09/2017), Cancer ([facility] [address] COPD, mild ([facility] [address] (11/28/2017), Depression, Dyslipidemia, GERD (gastroesophageal reflux disease), Hemangioma of skin, History of esophageal dilatation, History of fracture of foot (right), HTN (hypertension), Hypertonicity of bladder, Hypertrophy of prostate with urinary obstruction and other lower urinary tract symptoms (LUTS), Impotence of organic origin, Leg pain, Low serum testosterone level, JENNIFER (obstructive sleep apnea) (11/28/2017), Osteoarthrosis, unspecified whether generalized or localized, unspecified site, and Unspecified disorder of kidney and ureter (2008). Mr. Shyann Roblero  has a past surgical history that includes hx tumor removal (Left, 50 years ago); hx colonoscopy; hx endoscopy; hx heent; hx other surgical (Left, 08/28/2016); hx shoulder arthroscopy (Left); hx shoulder replacement (Left, 2015); and hx orthopaedic (Right, 2016). SOCIAL HISTORY/LIVING ENVIRONMENT:    Pt lives in a 1 story home with his wife.      Social History     Socioeconomic History    Marital status:      Spouse name: Not on file    Number of children: Not on file    Years of education: Not on file    Highest education level: Not on file   Occupational History    Not on file   Social Needs    Financial resource strain: Not on file    Food insecurity:     Worry: Not on file     Inability: Not on file    Transportation needs:     Medical: Not on file     Non-medical: Not on file   Tobacco Use    Smoking status: Former Smoker     Packs/day: 1.00     Years: 30.00     Pack years: 30.00     Types: Cigars     Last attempt to quit:      Years since quittin.5    Smokeless tobacco: Never Used    Tobacco comment: Restarted somking after 10/16, and again 2017.quit age 42-started back X 1 yr   Substance and Sexual Activity    Alcohol use: Yes     Alcohol/week: 3.6 oz     Types: 2 Cans of beer, 4 Shots of liquor per week     Comment: daily    Drug use: Yes     Types: Marijuana     Comment: last use 2019    Sexual activity: Not on file   Lifestyle    Physical activity:     Days per week: Not on file     Minutes per session: Not on file    Stress: Not on file   Relationships    Social connections:     Talks on phone: Not on file     Gets together: Not on file     Attends Denominational service: Not on file     Active member of club or organization: Not on file     Attends meetings of clubs or organizations: Not on file     Relationship status: Not on file    Intimate partner violence:     Fear of current or ex partner: Not on file     Emotionally abused: Not on file     Physically abused: Not on file     Forced sexual activity: Not on file   Other Topics Concern    Not on file   Social History Narrative    Not on file       LIFESTYLE Date: 7/10/2019   Occupation: Retired   Vigorous Activity: no   Expose individual to vibrations no   Unpleasant work environment no     PRIOR LEVEL OF FUNCTION/WORK/ACTIVITY:   -Independent with all ADLs.      Active Ambulatory Problems     Diagnosis Date Noted    HTN (hypertension) 2013    Arthritis of left shoulder region 2016    Esophageal stricture 2016    Dyslipidemia 03/22/2016    Arthritis, multiple joint involvement 03/22/2016    Low serum testosterone level 03/22/2016    Impotence of organic origin 03/22/2016    Benign non-nodular prostatic hyperplasia with lower urinary tract symptoms 03/22/2016    Hypertension 03/22/2016    History of colonic polyps 03/22/2016    Benign tumor of skin of leg 03/22/2016    Abnormal EKG 03/22/2016    GERD (gastroesophageal reflux disease) 09/26/2016    Status post total replacement of left shoulder 12/20/2016    COPD, mild ([address] 11/28/2017    Restrictive lung disease 11/28/2017    JENNIFER (obstructive sleep apnea) 11/28/2017    Spinal stenosis of lumbar region at multiple levels 05/24/2019    Spinal stenosis 05/24/2019     Resolved Ambulatory Problems     Diagnosis Date Noted    RADHA (acute kidney injury) ([address] 03/12/2013    Malignant hypertension 03/14/2013     Past Medical History:   Diagnosis Date    Acute kidney failure, unspecified ([address]     RADHA (acute kidney injury) ([address] 3/12/2013    Arthritis of left shoulder region left    BBB (bundle branch block) 09/2017    Cancer ([address]     COPD, mild ([address] 11/28/2017    Depression     Dyslipidemia     GERD (gastroesophageal reflux disease)     Hemangioma of skin     History of esophageal dilatation     History of fracture of foot right    HTN (hypertension)     Hypertonicity of bladder     Hypertrophy of prostate with urinary obstruction and other lower urinary tract symptoms (LUTS)     Impotence of organic origin     Leg pain     Low serum testosterone level     JENNIFER (obstructive sleep apnea) 11/28/2017    Osteoarthrosis, unspecified whether generalized or localized, unspecified site     Unspecified disorder of kidney and ureter 2008       CURRENT MEDICATIONS:    Current Outpatient Medications:     aspirin 81 mg chewable tablet, Take 81 mg by mouth daily. , Disp: , Rfl:     metoprolol succinate (TOPROL-XL) 25 mg XL tablet, Take 25 mg by mouth daily. , Disp: , Rfl:     testosterone cypionate (DEPOTESTOTERONE CYPIONATE) 200 mg/mL injection, 200 mg by IntraMUSCular route every fourteen (14) days. , Disp: , Rfl:     pravastatin (PRAVACHOL) 20 mg tablet, Take 20 mg by mouth nightly., Disp: , Rfl:     traMADol (ULTRAM) 50 mg tablet, Take 1 Tab by mouth every six (6) hours as needed for Pain. Max Daily Amount: 200 mg. (Patient taking differently: Take 50 mg by mouth every six (6) hours as needed for Pain. Take / use AM day of surgery  per anesthesia protocols. Indications: PAIN), Disp: 120 Tab, Rfl: 5    amLODIPine (NORVASC) 10 mg tablet, Take 1 Tab by mouth daily. (Patient taking differently: Take 10 mg by mouth nightly. Indications: Hypertension), Disp: 90 Tab, Rfl: 1    lisinopril (PRINIVIL, ZESTRIL) 40 mg tablet, Take 1 Tab by mouth every evening., Disp: 90 Tab, Rfl: 1    omeprazole (PRILOSEC) 20 mg capsule, Take 1 Cap by mouth daily. (Patient taking differently: Take 20 mg by mouth nightly. Indications: GASTROESOPHAGEAL REFLUX), Disp: 90 Cap, Rfl: 1    MULTI-VITAMIN PO, Take  by mouth every morning. Indications: did not hold, Disp: , Rfl:     OMEGA 3 PO, Take 1 Cap by mouth nightly. Last dose 9/20/17  Indications: hold as of today, Disp: , Rfl:      Date Last Reviewed:  7/10/2019   Number of Personal Factors/Comorbidities that affect the Plan of Care: 0: LOW COMPLEXITY   EXAMINATION:   OBSERVATION/ORTHOSTATIC POSTURAL ASSESSMENT:     -Pt sits with forward head and rounded shoulders which indicate tight anterior chest musculature, upper trapezius, and levator scapula and weak posterior scapula musculature and deep cervical flexors. Pt displays decreased core motor control indicating weak core and low back musculature.       BALANCE Date: 7/10/2019 Date:    Right 30s    Left 35s      PELVIC COMPONENT Date:   7/10/2019 Date:   Standing Erik level    Standing PSIS level    Standing Sacral Sulci Decreased mobility with lateral flexion    Mary [facility] [employer] level    Seated PSIS level    Supine-to-Sit leg length NT        AROM/PROM         Joint: Date: 7/10/2019  Date:  Date:    Active LE ROM Right Left Right Left Right Left   Hip Flexion [facility]       Hip Extension [facility]       Hip ER [facility]       Hip IR [facility] WFL       Knee Extension [facility] WFL       Knee Flexion [facility] WFL       Knee Extension [facility] WF       Lumbar Flexion 55 degrees --       Lumbar Extension 10 degrees --       Lumbar Side-Bending 15 degrees 10 degrees       Lumbar Rotation 75% limited 75% limited         STRENGTH         Joint: Date: 7/10/2019  Date:  Date:     Right Left Right Left Right Left   Hip Abduction 4+/5 4/5       Hip Adduction 4+/5 4/5       Hip IR 4+/5 4/5       Hip ER 4+/5 4/5       Hip Flexion 4+/5 4/5       Knee Extension 4+/5 4+/5       Knee Flexion 4+/5 4+/5       Ankle DF 4+/5 4+/5       Ankle PF 4+/5 4+/5       Ankle IV 4+/5 4+/5       Ankle EV 4+/5 4+/5           PALPATION Date:  7/10/2019 Date: Date:   TTP R QL, B paraspinals, L glute med, L piriformis     TONE B paraspinals, B QL, L glute med, L piriformis     PA GLIDES Hypomobile due to surgery       SPECIAL TESTS: Assessed @ Initial Visit    -SCOUR: Positive L   -90/90: NT    -R:    -L:   -MIKALA 4: Positive tightness B   -SLUMP TEST: NT   -DEEP SQUAT: NT   -LUMBAR STENOSIS: No      -Bilateral symptoms:       -Leg pain more than back pain:       -Pain during walking/standing:       -Pain relief upon sitting:       -Age greater than 48 years:       NEUROLOGICAL SCREEN: Assessed @ Initial Visit    -RADIATING SYMPTOMS: No     -DERMATOMES: Normal and equal B    -MYOTOMES Date: 7/10/2019     Right Left   L2 & L3   (Hip Flexors) 5/5 5/5   L3-L4  (Knee Extensors) 5/5 5/5   L4  (Ankle DFs) 5/5 5/5   L5  (Hallux Ext) 5/5 5/5   L5-S1  (Ankle PFs) 5/5 5/5   S1-S2  (Ankle EVs) 5/5 5/5     -REFLEXES: Date: 7/10/2019     Right Left   L4  (Quadriceps) NT NT   S1  (Achilles) NT NT     FINDINGS  Date: 7/10/2019   Primary Disc Flattened Back Yes Radiographic Instability Excessive Lordosis no   SI Joint Dysfunction Flattened Back yes   Secondary Disc (DJD) Hypertrophic Supraspinous Ligament: Thickening along spinous process. no   Spine Stenosis Flattened Back yes   Facet Impingement Flexed back, usually unilateral no   Nerve Root Adhesion Bad posture, avoid forward flexion, stand with knees bent no     RED FLAGS: Date: 7/10/2019   Non-Mechanical pain distribution (cannot be produced, changed, or reduced during exam): NO   Cauda Equina Dysfunction: NO   Upper lumbar disc herniation in younger patients (femoral nerve tension test for lateral disc herniation in lower lumbar): NO   Lumbar compression fracture (age > 48, trauma, corticosteroid use NO   Spine Cancer (age > 48, pervious history of cancer, failure to improve in 1 month of therapy, no relief - be rest, duration > 1 month, unexplained weight loss, insidious onset, constitutional symptoms): NO   Ankylosing Spondylitis (age < 36, pain not relieved by supine, morning back stiffness, pain duration > 3 months, improved by exercise): NO   Sacral Fracture: NO       FUNCTIONAL MOBILITY:  Assessed @ Initial Visit    -Affecting participation in basic ADLs and functional tasks.   -Limited tolerance of walking and standing   -Ambulation/Gait: Decreased hip flexion B. Stiff torso   -Bed mobility: difficulty rolling   -Stairs: no difficulty   -Transfers: no difficulty   -Wheelchair: N/A     Body Structures Involved:  1. Bones  2. Joints  3. Muscles  4. Ligaments Body Functions Affected:  1. Sensory/Pain  2. Neuromusculoskeletal  3. Movement Related Activities and Participation Affected:  1. Mobility  2. Self Care  3. Domestic Life  4. Interpersonal Interactions and Relationships  5.  Community, Social and [place]   Number of elements that affect the Plan of Care: 4+: HIGH COMPLEXITY   CLINICAL PRESENTATION:   Presentation: Stable and uncomplicated: LOW COMPLEXITY   CLINICAL DECISION MAKING:   OUTCOME MEASURE USED:   Tool Used: Tool Used: Modified Oswestry Low Back Pain Questionnaire  Score:  Initial: 13/50  Most Recent: X/50 (Date: -- )   Interpretation of Score: Each section is scored on a 0-5 scale, 5 representing the greatest disability. The scores of each section are added together for a total score of 50. [place] / Product Type: Medicare /     MEDICAL NECESSITY:  · Skilled intervention continues to be required due to above deficits affecting participation in basic ADLs and overall functional tolerance. REASON FOR SERVICES/OTHER COMMENTS:  · Patient continues to require skilled intervention due to  above deficits affecting participation in basic ADLs and overall functional tolerance. Use of outcome tool(s) and clinical judgement create a POC that gives a: Clear prediction of patient's progress: LOW COMPLEXITY        TREATMENT/SESSION ASSESSMENT:  Lissa Best verbalized understanding of role of PT and POC. · Pain/ Symptoms: Initial:   4/10 Post Session:  4/10 ·   Compliance with Program/Exercises: Will assess as treatment progresses. · Recommendations/Intent for next treatment session: \"Next visit will focus on advancements to more challenging activities\". Total Treatment Duration:  PT Patient Time In/Time Out  Time In: 1300  Time Out: [address].  Kristie Tijerina, PT, DPT, COMT

## 2019-07-16 ENCOUNTER — HOSPITAL ENCOUNTER (OUTPATIENT)
Dept: PHYSICAL THERAPY | Age: 64
Discharge: HOME | End: 2019-07-16
Payer: MEDICARE

## 2019-07-16 PROCEDURE — 97110 THERAPEUTIC EXERCISES: CPT

## 2019-07-16 PROCEDURE — 97140 MANUAL THERAPY 1/> REGIONS: CPT

## 2019-07-16 NOTE — PROGRESS NOTES
Camille Mera  : 1955  [place] / Product Type: Medicare /  [address]  at [address] [facility]  11 [facility], [address] [place] [address], [address]  [phone]   [phone]      OUTPATIENT PHYSICAL THERAPY: Daily Treatment Note 2019  Visit Count:  2    ICD-10: Treatment Diagnosis:  Low back pain (M54.5)  Difficulty in walking, not elsewhere classified (R26.2)  Muscle weakness (generalized) (M62.81)       TREATMENT PLAN:  Effective Dates: 7/10/2019 TO 10/8/2019 (90 days).    Frequency/Duration: 2 times a week for 90 Days       PRE-TREATMENT SYMPTOMS/COMPLAINTS:  Pt reports no pain today but stiffness. He notes he cut his grass yesterday and had to stop twice due to stiffness. MEDICATIONS REVIEWED:  2019    UPDATED OBJECTIVE FINDINGS:  None Today   TREATMENT:   (In addition to Assessment/Re-Assessment sessions the following treatments were rendered)    THERAPEUTIC EXERCISE: (25 minutes):  Exercises per grid below to improve mobility, strength and balance. Required minimal visual and verbal cues to promote proper body alignment and promote proper body posture. Progressed resistance, range and complexity of movement as indicated. Date:  2019 Date:   Date:     Activity/Exercise Parameters Parameters Parameters   Nu-step 12min  Level 3     Bolster squeeze 1x10 reps  Hold 10s     Bridging 1x10 reps  Hold 3s  Slow descent     Pelvic tilts 1x10 reps  Hold 10s                         MANUAL THERAPY: (15 minutes): Joint mobilization, Soft tissue mobilization and Manipulation was utilized and necessary because of the patient's restricted joint motion, painful spasm, loss of articular motion and restricted motion of soft tissue. Silicone cup to incision and surrounding tissue.      (Used abbreviations: MET - muscle energy technique; PNF - proprioceptive neuromuscular facilitation; NMR - neuromuscular re-education; AP - anterior to posterior; PA - posterior to anterior)    MODALITIES: (0 minutes):      NOT TODAY      TREATMENT/SESSION ASSESSMENT:  Carolina Kathrin with no complaints of pain. Improved fascial mobility today however it continues to be restricted. Working on core motor control. [facility]    PAIN: Initial: 0/10 Post Session:  0/10     Treatment/Session Summary:    · Response to Treatment:  Pt tolerated treatment well. continue to monitor. 7/16/2019  · Communication/Consultation:  None today  · Equipment provided today:  None today  · Recommendations/Intent for next treatment session: \"Next visit will focus on advancements to more challenging activities\".     Total Treatment Billable Duration: 40min  PT Patient Time In/Time Out  Time In: 1015  Time Out: 1100  Mandy Reyes, PT, DPT, COMT    Future Appointments   Date Time Provider [place]   7/18/2019 10:15 AM Bina Truong, PT, DPT [facility]   7/23/2019 10:15 AM Bina Truong, PT, DPT [facility]   7/25/2019  9:30 AM Bina Truong, PT, DPT [facility]   7/30/2019 10:15 AM Bina Truong, PT, DPT [facility]   8/1/2019 10:15 AM Bina Truong, PT, DPT [facility]   8/6/2019 10:15 AM Bina Truong, PT, DPT [facility]   8/8/2019 10:15 AM Bina Truong, PT, DPT [facility]   8/13/2019 10:15 AM Bina Truong, PT, DPT [facility]   8/15/2019 10:15 AM Bina Truong, PT, DPT [facility]

## 2019-07-18 ENCOUNTER — HOSPITAL ENCOUNTER (OUTPATIENT)
Dept: PHYSICAL THERAPY | Age: 64
Discharge: HOME | End: 2019-07-18
Payer: MEDICARE

## 2019-07-18 PROCEDURE — 97110 THERAPEUTIC EXERCISES: CPT

## 2019-07-18 PROCEDURE — 97140 MANUAL THERAPY 1/> REGIONS: CPT

## 2019-07-18 NOTE — PROGRESS NOTES
Natasha Stanley  : 1955  [place] / Product Type: Medicare /  [address]  at [facility]  [address], [address] [facility], [place] [address] [address] [facility]  [phone]   [phone]      OUTPATIENT PHYSICAL THERAPY: Daily Treatment Note 2019  Visit Count:  3  ICD-10: Treatment Diagnosis:  Low back pain (M54.5)  Difficulty in walking, not elsewhere classified (R26.2)  Muscle weakness (generalized) (M62.81)       TREATMENT PLAN:  Effective Dates: 7/10/2019 TO 10/8/2019 (90 days).    Frequency/Duration: 2 times a week for 90 Days       PRE-TREATMENT SYMPTOMS/COMPLAINTS:  Pt reports his R shoulder has been bothering him since last treatment session due to positioning during manual therapy. He complains of stiffness after painting for 1.5 hours yesterday. MEDICATIONS REVIEWED:  2019    UPDATED OBJECTIVE FINDINGS:  None Today   TREATMENT:   (In addition to Assessment/Re-Assessment sessions the following treatments were rendered)    THERAPEUTIC EXERCISE: (28 minutes):  Exercises per grid below to improve mobility, strength and balance. Required minimal visual and verbal cues to promote proper body alignment and promote proper body posture. Progressed resistance, range and complexity of movement as indicated. Date:  2019 Date:  2019 Date:     Activity/Exercise Parameters Parameters Parameters   Nu-step 12min  Level 3 12min  Level 4    Bolster squeeze 1x10 reps  Hold 10s 1x10 reps  Hold 10s    Bridging 1x10 reps  Hold 3s  Slow descent 2x10 reps  Hold 3s  Slow descent    Pelvic tilts 1x10 reps  Hold 10s     Dead bug  [id number]   only LE    Bird dog  [id number]             MANUAL THERAPY: (15 minutes): Joint mobilization, Soft tissue mobilization and Manipulation was utilized and necessary because of the patient's restricted joint motion, painful spasm, loss of articular motion and restricted motion of soft tissue.  Silicone cup to incision and surrounding tissue. Thera roll to B lumbar paraspinals, QL, gluteal musculature, piriformis, hamstrings, gastroc. Scar massage. (Used abbreviations: MET - muscle energy technique; PNF - proprioceptive neuromuscular facilitation; NMR - neuromuscular re-education; AP - anterior to posterior; PA - posterior to anterior)    MODALITIES: (0 minutes):      NOT TODAY      TREATMENT/SESSION ASSESSMENT:  Camille Mera required verbal cues for dead bugs to maintain core engagement. He did not have R shoulder pain during manual today with R shoulder hanging off table. No complaints of pain. Improved fascial mobility today however it continues to be restricted. Working on core motor control. Pt provided with HEP printout. [employer] Portal    PAIN: Initial: 3/10-stiffness Post Session:  0/10     Treatment/Session Summary:    · Response to Treatment:  Pt tolerated treatment well. continue to monitor. 7/18/2019  · Communication/Consultation:  None today  · Equipment provided today:  None today  · Recommendations/Intent for next treatment session: \"Next visit will focus on advancements to more challenging activities\".     Total Treatment Billable Duration: 43min  PT Patient Time In/Time Out  Time In: 1015  Time Out: 1100  Laine Landry, PT, DPT, COMT    Future Appointments   Date Time Provider Teja De La Torre   7/23/2019 10:15 AM Dorian Palomares, PT, DPT [facility] ([place], [place])   7/25/2019  9:30 AM Dorian Palomares, PT, DPT [facility]   7/30/2019 10:15 AM Dorian Palomares, PT, DPT [facility]   8/1/2019 10:15 AM Dorian Palomares, PT, DPT [facility]   8/6/2019 10:15 AM Dorian Palomares, PT, DPT [facility]   8/8/2019 10:15 AM Dorian Palomares, PT, DPT [facility]   8/13/2019 10:15 AM Dorian Palomares, PT, DPT [facility] [place]   8/15/2019 10:15 AM Dorian Palomares, PT, DPT [facility] [place]

## 2019-07-23 ENCOUNTER — HOSPITAL ENCOUNTER (OUTPATIENT)
Dept: PHYSICAL THERAPY | Age: 64
Discharge: HOME | End: 2019-07-23
Payer: MEDICARE

## 2019-07-23 PROCEDURE — 97140 MANUAL THERAPY 1/> REGIONS: CPT

## 2019-07-23 PROCEDURE — 97110 THERAPEUTIC EXERCISES: CPT

## 2019-07-23 NOTE — PROGRESS NOTES
Doc Jose G  : 1955  [place] / Product Type: Medicare /  [address]  at [facility]  [address], [address] [facility], [address]  [phone]   [phone]      OUTPATIENT PHYSICAL THERAPY: Daily Treatment Note 2019  Visit Count:  4  ICD-10: Treatment Diagnosis:  Low back pain (M54.5)  Difficulty in walking, not elsewhere classified (R26.2)  Muscle weakness (generalized) (M62.81)       TREATMENT PLAN:  Effective Dates: 7/10/2019 TO 10/8/2019 (90 days).    Frequency/Duration: 2 times a week for 90 Days       PRE-TREATMENT SYMPTOMS/COMPLAINTS:  Pt reports he is not having any pain and his shoulder is feeling better. He states the stiffness is getting better everyday. He states he has been keeping up with scar massage. MEDICATIONS REVIEWED:  2019    UPDATED OBJECTIVE FINDINGS:  None Today   TREATMENT:   (In addition to Assessment/Re-Assessment sessions the following treatments were rendered)    THERAPEUTIC EXERCISE: (28 minutes):  Exercises per grid below to improve mobility, strength and balance. Required minimal visual and verbal cues to promote proper body alignment and promote proper body posture. Progressed resistance, range and complexity of movement as indicated.      Date:  2019 Date:  2019 Date:  2019   Activity/Exercise Parameters Parameters Parameters   Nu-step 12min  Level 3 12min  Level 4 12min  Level 4   Bolster squeeze 1x10 reps  Hold 10s 1x10 reps  Hold 10s    Bridging 1x10 reps  Hold 3s  Slow descent 2x10 reps  Hold 3s  Slow descent 1x10 reps  Marching incorporated   Pelvic tilts 1x10 reps  Hold 10s     Dead bug  [id number]   only LE    Bird dog  [id number]     Physioball SLR hip flexion with bridge   1x10 reps  Green physioball   Physioball hamstring curls   1x10 reps  Green physioball                             MANUAL THERAPY: (15 minutes): Joint mobilization, Soft tissue mobilization and Manipulation was utilized and necessary because of the patient's restricted joint motion, painful spasm, loss of articular motion and restricted motion of soft tissue. Silicone cup to incision and surrounding tissue (Not today-7/23/2019). Thera roll to B lumbar paraspinals, QL, gluteal musculature, piriformis, hamstrings, gastroc. Scar massage. (Used abbreviations: MET - muscle energy technique; PNF - proprioceptive neuromuscular facilitation; NMR - neuromuscular re-education; AP - anterior to posterior; PA - posterior to anterior)    MODALITIES: (0 minutes):      NOT TODAY      TREATMENT/SESSION ASSESSMENT:  Bib Baker required verbal cues for dead bugs to maintain core engagement. He did not have R shoulder pain during manual today with R shoulder hanging off table. No complaints of pain. Improved fascial mobility today however it continues to be restricted. Working on core motor control. Pt provided with HEP printout. [employer] Portal    PAIN: Initial: 0/10 Post Session:  0/10     Treatment/Session Summary:    · Response to Treatment:  Pt tolerated treatment well. continue to monitor. 7/23/2019  · Communication/Consultation:  None today  · Equipment provided today:  None today  · Recommendations/Intent for next treatment session: \"Next visit will focus on advancements to more challenging activities\".     Total Treatment Billable Duration: 43min  PT Patient Time In/Time Out  Time In: 1015  Time Out: 1100  Aislinn Patton PT, DPT, COMT    Future Appointments   Date Time Provider Teja De La Torre   7/25/2019  9:30 AM Lalita Seymour PT, DPT [facility] [place] [place]   7/30/2019 10:15 AM Lalita Seymour PT, DPT [facility]   8/1/2019 10:15 AM Lalita Seymour PT, DPT [facility]   8/6/2019 10:15 AM Lalita Seymour PT, DPT [facility]   8/8/2019 10:15 AM Lalita Seymour PT, DPT [facility] [place] [place]   8/13/2019 10:15 AM Lalita Seymour PT, DPT [facility] [place] [place]   8/15/2019 10:15 AM Lalita Seymour PT, [facility]

## 2019-07-25 ENCOUNTER — HOSPITAL ENCOUNTER (OUTPATIENT)
Dept: PHYSICAL THERAPY | Age: 64
Discharge: HOME | End: 2019-07-25
Payer: MEDICARE

## 2019-07-25 PROCEDURE — 97140 MANUAL THERAPY 1/> REGIONS: CPT

## 2019-07-25 PROCEDURE — 97110 THERAPEUTIC EXERCISES: CPT

## 2019-07-25 NOTE — PROGRESS NOTES
Dinorah Parrish  : 1955  [place] / Product Type: Medicare /  [address]  at [facility]  [address], [address] [facility], [address]  [phone]   [phone]      OUTPATIENT PHYSICAL THERAPY: Daily Treatment Note 2019  Visit Count:  5  ICD-10: Treatment Diagnosis:  Low back pain (M54.5)  Difficulty in walking, not elsewhere classified (R26.2)  Muscle weakness (generalized) (M62.81)       TREATMENT PLAN:  Effective Dates: 7/10/2019 TO 10/8/2019 (90 days).    Frequency/Duration: 2 times a week for 90 Days       PRE-TREATMENT SYMPTOMS/COMPLAINTS:  Pt notes he was able to cut his grass yesterday with decreased stiffness. He states if he stretches prior to activities he is able to do more. MEDICATIONS REVIEWED:  2019    UPDATED OBJECTIVE FINDINGS:  None Today   TREATMENT:   (In addition to Assessment/Re-Assessment sessions the following treatments were rendered)    THERAPEUTIC EXERCISE: (30 minutes):  Exercises per grid below to improve mobility, strength and balance. Required minimal visual and verbal cues to promote proper body alignment and promote proper body posture. Progressed resistance, range and complexity of movement as indicated.      Date:  2019 Date:  2019 Date:  2019 Date:  2019    Activity/Exercise Parameters Parameters Parameters Parameters    Physiostep    12min  Level 5    Nu-step 12min  Level 3 12min  Level 4 12min  Level 4     Bolster squeeze 1x10 reps  Hold 10s 1x10 reps  Hold 10s      Bridging 1x10 reps  Hold 3s  Slow descent 2x10 reps  Hold 3s  Slow descent 1x10 reps  Marching incorporated     Pelvic tilts 1x10 reps  Hold 10s       Dead bug  [id number]   only LE  [id number]   LE and UE    Bird dog  [id number]   [id number]   LE and UE    Physioball SLR hip flexion with bridge   1x10 reps  Green physioball     Physioball hamstring curls   1x10 reps  Green physioball     Child's pose 1x10 reps  Hold 10s    Gastroc stretch    3x1min  2nd notch from top    Standing Marching    1x20 reps  Blue airex  Hold 2s              MANUAL THERAPY: (13 minutes): Joint mobilization, Soft tissue mobilization and Manipulation was utilized and necessary because of the patient's restricted joint motion, painful spasm, loss of articular motion and restricted motion of soft tissue. Silicone cup to incision and surrounding tissue (Not today-7/23/2019). Thera roll to B lumbar paraspinals, QL, gluteal musculature, piriformis, hamstrings, gastroc. Scar massage. (Used abbreviations: MET - muscle energy technique; PNF - proprioceptive neuromuscular facilitation; NMR - neuromuscular re-education; AP - anterior to posterior; PA - posterior to anterior)    MODALITIES: (0 minutes):      NOT TODAY      TREATMENT/SESSION ASSESSMENT:  Rosaline Pickens with improved lumbar AROM during exercises today. Decreased complaints of stiffness. [facility]    PAIN: Initial: 0/10 Post Session:  0/10     Treatment/Session Summary:    · Response to Treatment:  Pt tolerated treatment well. continue to monitor. 7/25/2019  · Communication/Consultation:  None today  · Equipment provided today:  None today  · Recommendations/Intent for next treatment session: \"Next visit will focus on advancements to more challenging activities\".     Total Treatment Billable Duration: 43min  PT Patient Time In/Time Out  Time In: 0930  Time Out: [address] [facility] Msb, PT, DPT, COMT    Future Appointments   Date Time Provider Teja De La Torre   7/30/2019 10:15 AM Kelsea Saleh PT, DPT [facility]   8/1/2019 10:15 AM Kelsea Saleh PT, DPT [facility]   8/6/2019 10:15 AM Kelsea Saleh PT, DPT [facility] D   8/8/2019 10:15 AM Kelsea Saleh PT, DPMARK [facility]   8/13/2019 10:15 AM Kelsea Saleh PT, DPT [facility]   8/15/2019 10:15 AM Kelsea Saleh PT, DPT [facility]

## 2019-07-30 ENCOUNTER — HOSPITAL ENCOUNTER (OUTPATIENT)
Dept: PHYSICAL THERAPY | Age: 64
Discharge: HOME | End: 2019-07-30
Payer: MEDICARE

## 2019-07-30 PROCEDURE — 97110 THERAPEUTIC EXERCISES: CPT

## 2019-07-30 NOTE — PROGRESS NOTES
Brandon Sports  : 1955  [place] / Product Type: Medicare /  [address]  at [facility]  [address] [facility]", [address]  [phone]   [phone]      OUTPATIENT PHYSICAL THERAPY: Daily Treatment Note 2019  Visit Count:  6  ICD-10: Treatment Diagnosis:  Low back pain (M54.5)  Difficulty in walking, not elsewhere classified (R26.2)  Muscle weakness (generalized) (M62.81)       TREATMENT PLAN:  Effective Dates: 7/10/2019 TO 10/8/2019 (90 days).    Frequency/Duration: 2 times a week for 90 Days       PRE-TREATMENT SYMPTOMS/COMPLAINTS:  Pt notes continued improvements. No pain at this time. MEDICATIONS REVIEWED:  2019    UPDATED OBJECTIVE FINDINGS:  None Today   TREATMENT:   (In addition to Assessment/Re-Assessment sessions the following treatments were rendered)    THERAPEUTIC EXERCISE: (40 minutes):  Exercises per grid below to improve mobility, strength and balance. Required minimal visual and verbal cues to promote proper body alignment and promote proper body posture. Progressed resistance, range and complexity of movement as indicated.      Date:  2019 Date:  2019 Date:  2019 Date:  2019 Date:  2019   Activity/Exercise Parameters Parameters Parameters Parameters    Airdyne     12min   Physiostep    12min  Level 5    Nu-step 12min  Level 3 12min  Level 4 12min  Level 4     Bolster squeeze 1x10 reps  Hold 10s 1x10 reps  Hold 10s      Bridging 1x10 reps  Hold 3s  Slow descent 2x10 reps  Hold 3s  Slow descent 1x10 reps  Marching incorporated     Pelvic tilts 1x10 reps  Hold 10s       Dead bug  [id number]   only LE  [id number]   LE and UE    Bird dog  [id number]   [id number]   LE and UE    Physioball SLR hip flexion with bridge   1x10 reps  Green physioball     Physioball hamstring curls   1x10 reps  Green physioball     Child's pose    1x10 reps  Hold 10s    Gastroc stretch 3x1min  2nd notch from top 3x1min  2nd notch from top   Standing Marching    1x20 reps  Blue airex  Hold 2s 1x20 reps  BOSU bubble   Wobble board PF/DF     1x20 reps  -stopping in the middle   Wobble board IV/EV     1x20 reps  -stopping in the middle   Mini squats     2x15 reps  Flat part of BOSU   Horizontal shoulder movements with weighted ball while holding mini squat on flat part of bosu     3x30s  Flat part of BOSU   Vertical shoulder movements with weighted ball while holding mini squat on flat part of bosu     3x30s  Flat part of BOSU   Matrix leg press     3x10 reps  85#     MANUAL THERAPY: (0 minutes): Joint mobilization, Soft tissue mobilization and Manipulation was utilized and necessary because of the patient's restricted joint motion, painful spasm, loss of articular motion and restricted motion of soft tissue. Silicone cup to incision and surrounding tissue (Not today-7/23/2019). Thera roll to B lumbar paraspinals, QL, gluteal musculature, piriformis, hamstrings, gastroc. Scar massage. (Used abbreviations: MET - muscle energy technique; PNF - proprioceptive neuromuscular facilitation; NMR - neuromuscular re-education; AP - anterior to posterior; PA - posterior to anterior)    MODALITIES: (0 minutes):      NOT TODAY      TREATMENT/SESSION ASSESSMENT:  Carolina Pinon fatigued with standing exercises. Continue working on standing tolerance and stamina with dynamic exercises. [facility] Portal    PAIN: Initial: 0/10 Post Session:  0/10     Treatment/Session Summary:    · Response to Treatment:  Pt tolerated treatment well. continue to monitor. 7/30/2019  · Communication/Consultation:  None today  · Equipment provided today:  None today  · Recommendations/Intent for next treatment session: \"Next visit will focus on advancements to more challenging activities\".     Total Treatment Billable Duration: 40min  PT Patient Time In/Time Out  Time In: 1015  Time Out: 98 Linus Trejo, PT, DPT, COMT    Future Appointments   Date Time Provider Teja De La Torre   8/1/2019 10:15 AM Quiana Morris, PT, DPT [facility]   8/6/2019 10:15 AM Quiana Morris PT, DPT [facility]   8/8/2019 10:15 AM Quiana Morris PT, DPT [facility]   8/13/2019 10:15 AM Quiana Morris PT, DPT [facility]   8/15/2019 10:15 AM Quiana Morris PT, DPT [facility]

## 2019-08-01 ENCOUNTER — HOSPITAL ENCOUNTER (OUTPATIENT)
Dept: PHYSICAL THERAPY | Age: 64
Discharge: HOME | End: 2019-08-01
Payer: MEDICARE

## 2019-08-01 PROCEDURE — 97110 THERAPEUTIC EXERCISES: CPT

## 2019-08-01 PROCEDURE — 97140 MANUAL THERAPY 1/> REGIONS: CPT

## 2019-08-02 NOTE — PROGRESS NOTES
Roel Peter  : 1955  [place] / Product Type: Medicare /  [address]  at [facility]  [address], [address] [facility], [address]  [phone]   [phone]      OUTPATIENT PHYSICAL THERAPY: Daily Treatment Note 2019  Visit Count:  7  ICD-10: Treatment Diagnosis:  Low back pain (M54.5)  Difficulty in walking, not elsewhere classified (R26.2)  Muscle weakness (generalized) (M62.81)       TREATMENT PLAN:  Effective Dates: 7/10/2019 TO 10/8/2019 (90 days).    Frequency/Duration: 2 times a week for 90 Days        PRE-TREATMENT SYMPTOMS/COMPLAINTS:  Pt notes soreness after last treatment session. Rates soreness 2/10. States he has been trying new machines at the gym. MEDICATIONS REVIEWED:  2019    UPDATED OBJECTIVE FINDINGS:  None Today   TREATMENT:   (In addition to Assessment/Re-Assessment sessions the following treatments were rendered)    THERAPEUTIC EXERCISE: (23 minutes):  Exercises per grid below to improve mobility, strength and balance. Required minimal visual and verbal cues to promote proper body alignment and promote proper body posture. Progressed resistance, range and complexity of movement as indicated.      Date:  2019 Date:  2019 Date:  2019 Date:  2019 Date:  2019 Date:  2019   Activity/Exercise Parameters Parameters Parameters Parameters     Airdyne     12min 12min   Physiostep    12min  Level 5     Nu-step 12min  Level 3 12min  Level 4 12min  Level 4      Bolster squeeze 1x10 reps  Hold 10s 1x10 reps  Hold 10s       Bridging 1x10 reps  Hold 3s  Slow descent 2x10 reps  Hold 3s  Slow descent 1x10 reps  Marching incorporated      Pelvic tilts 1x10 reps  Hold 10s        Dead bug  [id number]   only LE  [id number]   LE and UE  2x10 reps B   Bird dog  [id number]   [id number]   LE and UE  2x10 reps B   Physioball SLR hip flexion with bridge   1x10 reps  Green physioball   1x10 reps  Green physioball   Physioball hamstring curls   1x10 reps  Green physioball   1x10 reps  Green physioball   Child's pose    1x10 reps  Hold 10s     Gastroc stretch    3x1min  2nd notch from top 3x1min  2nd notch from top    Standing Marching    1x20 reps  Blue airex  Hold 2s 1x20 reps  BOSU bubble    Wobble board PF/DF     1x20 reps  -stopping in the middle    Wobble board IV/EV     1x20 reps  -stopping in the middle    Mini squats     2x15 reps  Flat part of BOSU    Horizontal shoulder movements with weighted ball while holding mini squat on flat part of bosu     3x30s  Flat part of BOSU    Vertical shoulder movements with weighted ball while holding mini squat on flat part of bosu     3x30s  Flat part of BOSU    Matrix leg press     3x10 reps  85#      MANUAL THERAPY: (20 minutes): Joint mobilization, Soft tissue mobilization and Manipulation was utilized and necessary because of the patient's restricted joint motion, painful spasm, loss of articular motion and restricted motion of soft tissue. Silicone cup to incision and surrounding tissue (Not today-7/23/2019). Thera roll to B lumbar paraspinals, QL, gluteal musculature, piriformis, hamstrings, gastroc. Scar massage. (Used abbreviations: MET - muscle energy technique; PNF - proprioceptive neuromuscular facilitation; NMR - neuromuscular re-education; AP - anterior to posterior; PA - posterior to anterior)    MODALITIES: (0 minutes):      NOT TODAY      TREATMENT/SESSION ASSESSMENT:  Pearle Lampjose with tenderness to B hamstrings during manual therapy. Decreased core control during supine exercises. Continue working on standing tolerance and dynamic exercises while engaging core. [employer] Portal    PAIN: Initial: 2/10 Post Session:  0/10     Treatment/Session Summary:    · Response to Treatment:  Pt tolerated treatment well. continue to monitor. 8/1/2019  · Communication/Consultation:  None today  · Equipment provided today:  None today  · Recommendations/Intent for next treatment session: \"Next visit will focus on advancements to more challenging activities\".     Total Treatment Billable Duration: 40min  PT Patient Time In/Time Out  Time In: 1015  Time Out: 1100  Aislinn Patton, PT, DPT, COMT    Future Appointments   Date Time Provider Teja De La Torre   8/6/2019 10:15 AM Lalita Seymour PT, DPT [facility]   8/8/2019 10:15 AM [facility]   8/13/2019 10:15 AM Lalita Seymour PT, DPT [facility]   8/15/2019 10:15 AM Lalita Seymour PT, DPT [facility]

## 2019-08-06 ENCOUNTER — HOSPITAL ENCOUNTER (OUTPATIENT)
Dept: PHYSICAL THERAPY | Age: 64
Discharge: HOME | End: 2019-08-06
Payer: MEDICARE

## 2019-08-06 PROCEDURE — 97110 THERAPEUTIC EXERCISES: CPT

## 2019-08-06 NOTE — PROGRESS NOTES
Harvey Perez  : 1955  [place] / Product Type: Medicare /  [address]  at [facility]  [address] [facility], [place] [address] [address] [facility]  [phone]   [phone]      OUTPATIENT PHYSICAL THERAPY: Daily Treatment Note 2019  Visit Count:  8  ICD-10: Treatment Diagnosis:  Low back pain (M54.5)  Difficulty in walking, not elsewhere classified (R26.2)  Muscle weakness (generalized) (M62.81)       TREATMENT PLAN:  Effective Dates: 7/10/2019 TO 10/8/2019 (90 days).    Frequency/Duration: 2 times a week for 90 Days        PRE-TREATMENT SYMPTOMS/COMPLAINTS:  Pt notes he was not sore after last treatment session. He reports improvements in stiffness levels. Rates soreness 0/10. MEDICATIONS REVIEWED:  2019    UPDATED OBJECTIVE FINDINGS:  None Today   TREATMENT:   (In addition to Assessment/Re-Assessment sessions the following treatments were rendered)    THERAPEUTIC EXERCISE: (23 minutes):  Exercises per grid below to improve mobility, strength and balance. Required minimal visual and verbal cues to promote proper body alignment and promote proper body posture. Progressed resistance, range and complexity of movement as indicated.      Date:  2019 Date:  2019 Date:  2019 Date:  2019   Activity/Exercise Parameters      Airdyne  12min 12min 12min   Physiostep 12min  Level 5      Nu-step       Bolster squeeze       Bridging       Pelvic tilts       Dead bug [id number]   LE and UE  2x10 reps B    Bird dog [id number]   LE and UE  2x10 reps B    Physioball SLR hip flexion with bridge   1x10 reps  Green physioball    Physioball hamstring curls   1x10 reps  Green physioball    Child's pose 1x10 reps  Hold 10s      Gastroc stretch 3x1min  2nd notch from top 3x1min  2nd notch from top     Standing Marching 1x20 reps  Blue airex  Hold 2s 1x20 reps  BOSU bubble     Wobble board PF/DF  1x20 reps  -stopping in the middle  1x20 reps  -stopping in the middle   Wobble board IV/EV  1x20 reps  -stopping in the middle  1x20 reps  -stopping in the middle   Mini squats  2x15 reps  Flat part of BOSU     Horizontal shoulder movements with weighted ball while holding mini squat on flat part of bosu  3x30s  Flat part of BOSU     Vertical shoulder movements with weighted ball while holding mini squat on flat part of bosu  3x30s  Flat part of BOSU     Matrix leg press  3x10 reps  85#     Heel toe raises    1x20 reps   Bungee forward    1x10 reps   Bungee sideways    1x10 reps B   Bungee backwards    Next time   LTR     1x15 reps  Hold 5s                   MANUAL THERAPY: (0 minutes): Joint mobilization, Soft tissue mobilization and Manipulation was utilized and necessary because of the patient's restricted joint motion, painful spasm, loss of articular motion and restricted motion of soft tissue. Silicone cup to incision and surrounding tissue (Not today-7/23/2019). Thera roll to B lumbar paraspinals, QL, gluteal musculature, piriformis, hamstrings, gastroc. Scar massage. (Used abbreviations: MET - muscle energy technique; PNF - proprioceptive neuromuscular facilitation; NMR - neuromuscular re-education; AP - anterior to posterior; PA - posterior to anterior)    MODALITIES: (0 minutes):      NOT TODAY      TREATMENT/SESSION ASSESSMENT:  Rosalene Duverney reported stiffness during lateral bungee walking exercise. He reported his hips and lower back felt stiff. Pt reported feeling sore after treatment sessions. Continue working on lateral movements and hip strength. [facility]    PAIN: Initial: 0/10 Post Session:  0/10     Treatment/Session Summary:    · Response to Treatment:  Pt tolerated treatment well. continue to monitor. 8/6/2019  · Communication/Consultation:  None today  · Equipment provided today:  None today  · Recommendations/Intent for next treatment session:  \"Next visit will focus on advancements to more challenging activities\".     Total Treatment Billable Duration: 38min  PT Patient Time In/Time Out  Time In: 1020  Time Out: 1100  Daniela Marie, PT, DPT, COMT    Future Appointments   Date Time Provider Teja De La Torre   8/8/2019 10:15 AM Woody Friday, PT, DPT [facility]   8/13/2019 10:15 AM Woody Friday, PT, DPT [facility]   8/15/2019 10:15 AM [facility] Friday, PT, DPT [facility]

## 2019-08-08 ENCOUNTER — HOSPITAL ENCOUNTER (OUTPATIENT)
Dept: PHYSICAL THERAPY | Age: 64
Discharge: HOME | End: 2019-08-08
Payer: MEDICARE

## 2019-08-08 PROCEDURE — 97110 THERAPEUTIC EXERCISES: CPT

## 2019-08-08 NOTE — PROGRESS NOTES
Darleen Erm  : 1955  [place] / Product Type: Medicare /  [address]  [facility] [address] [facility], [address]  [phone]   [phone]      OUTPATIENT PHYSICAL THERAPY: Daily Treatment Note 2019  Visit Count:  9  ICD-10: Treatment Diagnosis:  Low back pain (M54.5)  Difficulty in walking, not elsewhere classified (R26.2)  Muscle weakness (generalized) (M62.81)       TREATMENT PLAN:  Effective Dates: 7/10/2019 TO 10/8/2019 (90 days).    Frequency/Duration: 2 times a week for 90 Days        PRE-TREATMENT SYMPTOMS/COMPLAINTS:  Pt notes he was slightly sore after last treatment session. He notes stiffness yesterday when he was cutting grass. Rates soreness 2/10 today. MEDICATIONS REVIEWED:  2019    UPDATED OBJECTIVE FINDINGS:  None Today   TREATMENT:   (In addition to Assessment/Re-Assessment sessions the following treatments were rendered)    THERAPEUTIC EXERCISE: (23 minutes):  Exercises per grid below to improve mobility, strength and balance. Required minimal visual and verbal cues to promote proper body alignment and promote proper body posture. Progressed resistance, range and complexity of movement as indicated.      Date:  2019 Date:  2019 Date:  2019 Date:  2019 Date:  2019   Activity/Exercise Parameters       Airdyne  12min 12min 12min 12min   Physiostep 12min  Level 5       Nu-step        Bolster squeeze        Bridging        Pelvic tilts        Dead bug [id number]   LE and UE  2x10 reps B     Bird dog [id number]   LE and UE  2x10 reps B     Physioball SLR hip flexion with bridge   1x10 reps  Green physioball     Physioball hamstring curls   1x10 reps  Green physioball     Child's pose 1x10 reps  Hold 10s       Gastroc stretch 3x1min  2nd notch from top 3x1min  2nd notch from top   3x1min  2nd notch from top   Standing Marching 1x20 reps  Blue airex  Hold 2s 1x20 reps  BOSU bubble      Wobble board PF/DF  1x20 reps  -stopping in the middle  1x20 reps  -stopping in the middle    Wobble board IV/EV  1x20 reps  -stopping in the middle  1x20 reps  -stopping in the middle    Mini squats  2x15 reps  Flat part of BOSU      Horizontal shoulder movements with weighted ball while holding mini squat on flat part of bosu  3x30s  Flat part of BOSU      Vertical shoulder movements with weighted ball while holding mini squat on flat part of bosu  3x30s  Flat part of BOSU      Matrix leg press  3x10 reps  85#   2x15 reps  85#   Heel toe raises    1x20 reps    Bungee forward    1x10 reps 1x10 reps   Bungee sideways    1x10 reps B    Bungee backwards    Next time    LTR     1x15 reps  Hold 5s    Matrix core stability punching out in mini squat     2x10 reps B  12.5#   Lateral step up and over     1x20 reps B  10\"     MANUAL THERAPY: (0 minutes): Joint mobilization, Soft tissue mobilization and Manipulation was utilized and necessary because of the patient's restricted joint motion, painful spasm, loss of articular motion and restricted motion of soft tissue. Silicone cup to incision and surrounding tissue (Not today-7/23/2019). Thera roll to B lumbar paraspinals, QL, gluteal musculature, piriformis, hamstrings, gastroc. Scar massage. (Used abbreviations: MET - muscle energy technique; PNF - proprioceptive neuromuscular facilitation; NMR - neuromuscular re-education; AP - anterior to posterior; PA - posterior to anterior)    MODALITIES: (0 minutes):      NOT TODAY      TREATMENT/SESSION ASSESSMENT:  Rosalene Duverney stated he was unable to perform 3 sets of leg press today due to weakness in legs. He tolerated new exercises well. Reported fatigue afterwards. Continue working on hip strength and lateral movements. [facility] Portal    PAIN: Initial: 0/10 Post Session:  0/10     Treatment/Session Summary:    · Response to Treatment:  Pt tolerated treatment well.  continue to monitor. 8/8/2019  · Communication/Consultation:  None today  · Equipment provided today:  None today  · Recommendations/Intent for next treatment session: \"Next visit will focus on advancements to more challenging activities\".     Total Treatment Billable Duration: 38min  PT Patient Time In/Time Out  Time In: 1015  Time Out: 1100  Rosemary Prescott, PT, DPT, COMT    Future Appointments   Date Time Provider Teja De La Torre   8/13/2019 10:15 AM Jorge Luis Root PT, DPT [facility]   8/15/2019 10:15 AM Jorge Luis Root PT, DPT [facility]

## 2019-08-13 ENCOUNTER — HOSPITAL ENCOUNTER (OUTPATIENT)
Dept: PHYSICAL THERAPY | Age: 64
Discharge: HOME | End: 2019-08-13
Payer: MEDICARE

## 2019-08-13 PROCEDURE — 97140 MANUAL THERAPY 1/> REGIONS: CPT

## 2019-08-13 PROCEDURE — 97110 THERAPEUTIC EXERCISES: CPT

## 2019-08-13 NOTE — PROGRESS NOTES
Rosaline Pickens  : 1955  [place] / Product Type: Medicare /  [address]  [facility] [address] [facility], [address]  [phone]   [phone]      OUTPATIENT PHYSICAL THERAPY: Daily Treatment Note 2019  Visit Count:  10  ICD-10: Treatment Diagnosis:  Low back pain (M54.5)  Difficulty in walking, not elsewhere classified (R26.2)  Muscle weakness (generalized) (M62.81)       TREATMENT PLAN:  Effective Dates: 7/10/2019 TO 10/8/2019 (90 days).    Frequency/Duration: 2 times a week for 90 Days        PRE-TREATMENT SYMPTOMS/COMPLAINTS:  Pt notes he is sore today after over doing it at the gym yesterday. He notes the stiffness has gotten better overall and only had one episode of stiffness last week after standing at the sink for too long doing dishes. He rates soreness and pain 3/10 today. MEDICATIONS REVIEWED:  2019    UPDATED OBJECTIVE FINDINGS:  None Today   TREATMENT:   (In addition to Assessment/Re-Assessment sessions the following treatments were rendered)    THERAPEUTIC EXERCISE: (23 minutes):  Exercises per grid below to improve mobility, strength and balance. Required minimal visual and verbal cues to promote proper body alignment and promote proper body posture. Progressed resistance, range and complexity of movement as indicated.      Date:  2019 Date:  2019 Date:  2019 Date:  2019 Date:  2019 Date:  2019   Activity/Exercise Parameters        Airdyne  12min 12min 12min 12min 12min   Physiostep 12min  Level 5        Nu-step         Bolster squeeze         Bridging         Pelvic tilts         Dead bug [id number]   LE and UE  2x10 reps B      Bird dog [id number]   LE and UE  2x10 reps B      Physioball SLR hip flexion with bridge   1x10 reps  Green physioball      Physioball hamstring curls   1x10 reps  Green physioball      Child's pose 1x10 reps  Hold 10s        Gastroc stretch 3x1min  2nd notch from top 3x1min  2nd notch from top   3x1min  2nd notch from top 3x1min  2nd notch from top   Standing Marching 1x20 reps  Blue airex  Hold 2s 1x20 reps  BOSU bubble       Wobble board PF/DF  1x20 reps  -stopping in the middle  1x20 reps  -stopping in the middle     Wobble board IV/EV  1x20 reps  -stopping in the middle  1x20 reps  -stopping in the middle     Mini squats  2x15 reps  Flat part of BOSU       Horizontal shoulder movements with weighted ball while holding mini squat on flat part of bosu  3x30s  Flat part of BOSU       Vertical shoulder movements with weighted ball while holding mini squat on flat part of bosu  3x30s  Flat part of BOSU       Matrix leg press  3x10 reps  85#   2x15 reps  85#    Heel toe raises    1x20 reps     Bungee forward    1x10 reps 1x10 reps    Bungee sideways    1x10 reps B     Bungee backwards    Next time     LTR     1x15 reps  Hold 5s     Matrix core stability punching out in mini squat     2x10 reps B  12.5# 2x10 reps B  7.5# due to soreness today   [place]o dead lifts      1x10 reps B  Holding 7# hand weights   Lateral step up and over     1x20 reps B  10\"    Hamstring stretch      3x30s     MANUAL THERAPY: (20 minutes): Joint mobilization, Soft tissue mobilization and Manipulation was utilized and necessary because of the patient's restricted joint motion, painful spasm, loss of articular motion and restricted motion of soft tissue. Silicone cup to incision and surrounding tissue (Not today-7/23/2019). Thera roll to B lumbar paraspinals, QL, gluteal musculature, piriformis, hamstrings, gastroc. Scar massage.     (Used abbreviations: MET - muscle energy technique; PNF - proprioceptive neuromuscular facilitation; NMR - neuromuscular re-education; AP - anterior to posterior; PA - posterior to anterior)    MODALITIES: (0 minutes):      NOT TODAY      TREATMENT/SESSION ASSESSMENT:  Corinne  with soreness and tenderness during thera-roll after over doing at the gym. He performed exercises well however required verbal cues to perform joe dead lifts well. PN next visit. Pt is doing very well however continues to require verbal cues. [facility] [facility]    PAIN: Initial: 3/10 Post Session:  0/10     Treatment/Session Summary:    · Response to Treatment:  Pt tolerated treatment well. continue to monitor. 8/13/2019  · Communication/Consultation:  None today  · Equipment provided today:  None today  · Recommendations/Intent for next treatment session: \"Next visit will focus on advancements to more challenging activities\".     Total Treatment Billable Duration: 43min  PT Patient Time In/Time Out  Time In: 1015  Time Out: 1100  Vamsi Martini, PT, DPT, COMT    Future Appointments   Date Time Provider Teaj De La Torre   8/15/2019 10:15 AM Laila Loera, PT, DPT [facility]

## 2019-08-15 ENCOUNTER — HOSPITAL ENCOUNTER (OUTPATIENT)
Dept: PHYSICAL THERAPY | Age: 64
Discharge: HOME | End: 2019-08-15
Payer: MEDICARE

## 2019-08-15 PROCEDURE — 97140 MANUAL THERAPY 1/> REGIONS: CPT

## 2019-08-15 PROCEDURE — 97110 THERAPEUTIC EXERCISES: CPT

## 2019-08-15 NOTE — THERAPY DISCHARGE
Rosaline Pcikens  : 1955  [place] / [place] / Product Type: Medicare /  [address]  at [facility]  [facility] [facility], [place] [address] [address] [facility]  [phone]   [phone]                OUTPATIENT PHYSICAL [facility] 8/15/2019    ICD-10: Treatment Diagnosis:  Low back pain (M54.5)  Difficulty in walking, not elsewhere classified (R26.2)  Muscle weakness (generalized) (M62.81)        PRECAUTIONS/ALLERGIES:   Ibuprofen and Nsaids (non-steroidal anti-inflammatory drug)     FALL RISK SCORE: 1 (? 5 = High Risk)    MD ORDERS: Eval and Treat  MEDICAL/REFERRING DIAGNOSIS:  s/p back surgery     DATE OF ONSET: May 24, 2019 s/p bilateral L2, L3, L4 and L5 laminectomy with partial facetectomy and foraminotomy    REFERRING PHYSICIAN: Ildefonso Cota MD    RETURN PHYSICIAN APPOINTMENT: TBD by patient      Ambulatory/Rehab Services H2 Model Falls Risk Assessment    Risk Factors:       (1)  Gender [Male] Ability to Rise from Chair:       (0)  Ability to rise in a single movement    Falls Prevention Plan:       No modifications necessary   Total: (5 or greater = High Risk): 1     [facility]. All Rights Reserved. [facility] States Patent #[id number]. Federal Law prohibits the replication, distribution or use without written permission from [address] [place]: Pt made great improvements since beginning physical therapy. He attended 10 visits as of 8/15/2019 including the initial evaluation. Pt improved overall mobility, strength, and tolerance for functional mobility. Pt is now going to the gym regularly and complaining less of stiffness in his lower back. Pt denies pain and reports to tenderness throughout his lower back. He has met all goals. INITIAL ASSESSMENT:  Mr. Rosaline Pickens has attended 1 physical therapy session including initial evaluation as of 7/10/2019.   Rosaline Pickens exhibits decreased flexibility, increased pain, decreased postural and core strength, decreased functional tolerance, and decreased general LE strength s/p bilateral L2, L3, L4 and L5 laminectomy with partial facetectomy and foraminotomy. No pelvic malalignment upon initial evaluation but decreased posture. Significant decreased fascial mobility around incision especially distally. Overall pt is doing well but overdoing it at times when painting his front steps and cutting his grass. His biggest complaint is stiffness. Nolan Shen will benefit from skilled PT (medically necessary) to address above deficits affecting participation in basic ADLs and overall functional tolerance. Manual techniques (stretching, joint mobilizations, soft tissue mobilization/myofascial release), postural exercises/education, therapeutic techniques/activities, and HEP will be performed as appropriate addressing Jessica Ge's current condition. PROBLEM LIST (Impacting functional limitations):  1. Decreased Strength  2. Decreased ADL/Functional Activities  3. Decreased Transfer Abilities  4. Decreased Ambulation Ability/Technique  5. Decreased Balance  6. Increased Pain  7. Decreased Activity Tolerance  8. Increased Fatigue  9. Increased Shortness of Breath  10. Decreased Flexibility/Joint Mobility  11. Decreased [place] with Home Exercise Program INTERVENTIONS PLANNED:  1. Balance Exercise  2. Bed Mobility  3. Cold  4. Cryotherapy  5. Electrical Stimulation  6. Family Education  7. Gait Training  8. Heat  9. Home Exercise Program (HEP)  10. Manual Therapy  11. Neuromuscular Re-education/Strengthening  12. Range of Motion (ROM)  13. Therapeutic Activites  14. Therapeutic Exercise/Strengthening  15. Transfer Training  16. Mechanical traction  17. Aquatic Therapy  18. Electrical Stimulation   TREATMENT PLAN:  Effective Dates: 7/10/2019 TO 10/8/2019 (90 days).     Frequency/Duration: 2 times a week for 90 Days    GOALS: (Goals have been discussed and agreed upon with patient.)  Short Term Goals 4 weeks   1. Rolanda Asa will be independent with HEP to promote self-management of symptoms. -MET 8/15/2019  2. Rolanda Asa will participate in LE stretching program to increase hamstring flexibility for facilitation of ADL performance. -MET 8/15/2019  3. Rolanda Asa will participate in core stabilization exercises to help with stabilization and improve posture during ADLs to help prevent future injuries. -MET 8/15/2019  4. Rolanda Asa will participate in LE strengthening program with weights as appropriate to help with gait and elevations. -MET 8/15/2019  5. Rolanda Asa will participate in static and dynamic balance activities to decrease the risk for falls and improve overall QOL. -MET 8/15/2019  6. Rolanda Asa will tolerate manual therapy/joint mobilizations/soft tissue to increase ROM and decrease pain to improve functional mobility during ADLs. -MET 8/15/2019    Long Term Goals 12 weeks   1. Roalnda Asa will demonstrate an 5 point improvement on the Oswestry/LEFs to show improvement in function. -MET 8/15/2019  2. Rolanda Asa will report <=2/10 pain at rest and during ADLs to improve QOL. -MET 8/15/2019  3. Rolanda Asa will demonstrate >=4+/5 LE strength on manual muscle testing to improve functional mobility. -MET 8/15/2019  4. Rolanda Asa will be able to demonstrate safe lifting and transfer mechanics without cueing for improved safety with home, childcare, and community activities. -MET 8/15/2019      Rehabilitation Potential For Stated Goals: GOOD    Regarding Shahida Min Joo's therapy, I certify that the treatment plan above will be carried out by a therapist or under their direction. Thank you for this referral,  Gricelda Payne, PT, DPT               HISTORY:   PATIENT GOAL FOR PHYSICAL THERAPY:  Pt notes he would like to decrease stiffness in his lower back.      HISTORY OF PRESENT INJURY/ILLNESS (REASON FOR REFERRAL):  Pt notes he was significant pain in lower back which is why he elected to have surgery May 24. Pt notes he has been going to the [employer] to exercise and is doing the elliptical machine for 20min. He states he is aware of his precautions but notes MD said he did not have to abide by them any more. Pt notes his biggest complaint is the stiffness and achyness in his lower back. He describes the pain as a cramping sensation in his lower back. Pt notes the stiffness can get as high as 6/10 and as low as 0/10. Pt notes the most difficulty don/doffing socks and shoes, cutting the grass, getting out of bed. Note: Patient denies any increase of symptoms with cough, sneeze or valsalva. Patient denies any saddle paresthesia or bowel/bladder deficits. KEY NOTES FOR HISTORY Date: 7/10/2019   Mechanism of injury S/p surgery   Prior history of low back pain yes   Leg pain no   Fear, depression, anxiety no   Behavior of condition acute   Irritability low   Previous failed treatments no   Sedentary lifestyle no       PAST MEDICAL HISTORY/COMORBIDITIES:   Mr. Lb Fishman  has a past medical history of Acute kidney failure, unspecified ([address] RADHA (acute kidney injury) ([address] (3/12/2013), Arthritis of left shoulder region (left), BBB (bundle branch block) (09/2017), Cancer ([address] COPD, mild ([address] (11/28/2017), Depression, Dyslipidemia, GERD (gastroesophageal reflux disease), Hemangioma of skin, History of esophageal dilatation, History of fracture of foot (right), HTN (hypertension), Hypertonicity of bladder, Hypertrophy of prostate with urinary obstruction and other lower urinary tract symptoms (LUTS), Impotence of organic origin, Leg pain, Low serum testosterone level, JENNIFER (obstructive sleep apnea) (11/28/2017), Osteoarthrosis, unspecified whether generalized or localized, unspecified site, and Unspecified disorder of kidney and ureter (2008).   Mr. Lb Fishman  has a past surgical history that includes hx tumor removal (Left, 50 years ago); hx colonoscopy; hx endoscopy; hx heent; hx other surgical (Left, 2016); hx shoulder arthroscopy (Left); hx shoulder replacement (Left, ); and hx orthopaedic (Right, 2016). SOCIAL HISTORY/LIVING ENVIRONMENT:    Pt lives in a 1 story home with his wife. Social History     Socioeconomic History    Marital status:      Spouse name: Not on file    Number of children: Not on file    Years of education: Not on file    Highest education level: Not on file   Occupational History    Not on file   Social Needs    Financial resource strain: Not on file    Food insecurity:     Worry: Not on file     Inability: Not on file    Transportation needs:     Medical: Not on file     Non-medical: Not on file   Tobacco Use    Smoking status: Former Smoker     Packs/day: 1.00     Years: 30.00     Pack years: 30.00     Types: Cigars     Last attempt to quit:      Years since quittin.6    Smokeless tobacco: Never Used    Tobacco comment: Restarted somking after 10/16, and again 2017.quit age 42-started back X 1 yr   Substance and Sexual Activity    Alcohol use:  Yes     Alcohol/week: 6.0 standard drinks     Types: 2 Cans of beer, 4 Shots of liquor per week     Comment: daily    Drug use: Yes     Types: Marijuana     Comment: last use 2019    Sexual activity: Not on file   Lifestyle    Physical activity:     Days per week: Not on file     Minutes per session: Not on file    Stress: Not on file   Relationships    Social connections:     Talks on phone: Not on file     Gets together: Not on file     Attends Nondenominational service: Not on file     Active member of club or organization: Not on file     Attends meetings of clubs or organizations: Not on file     Relationship status: Not on file    Intimate partner violence:     Fear of current or ex partner: Not on file     Emotionally abused: Not on file     Physically abused: Not on file     Forced sexual activity: Not on file   Other Topics Concern    Not on file   Social History Narrative    Not on file       LIFESTYLE Date: 7/10/2019   Occupation: Retired   Vigorous Activity: no   Expose individual to vibrations no   Unpleasant work environment no     PRIOR LEVEL OF FUNCTION/WORK/ACTIVITY:   -Independent with all ADLs.      Active Ambulatory Problems     Diagnosis Date Noted    HTN (hypertension) 03/12/2013    Arthritis of left shoulder region 03/22/2016    Esophageal stricture 03/22/2016    Dyslipidemia 03/22/2016    Arthritis, multiple joint involvement 03/22/2016    Low serum testosterone level 03/22/2016    Impotence of organic origin 03/22/2016    Benign non-nodular prostatic hyperplasia with lower urinary tract symptoms 03/22/2016    Hypertension 03/22/2016    History of colonic polyps 03/22/2016    Benign tumor of skin of leg 03/22/2016    Abnormal EKG 03/22/2016    GERD (gastroesophageal reflux disease) 09/26/2016    Status post total replacement of left shoulder 12/20/2016    COPD, mild ([address] 11/28/2017    Restrictive lung disease 11/28/2017    JENNIFER (obstructive sleep apnea) 11/28/2017    Spinal stenosis of lumbar region at multiple levels 05/24/2019    Spinal stenosis 05/24/2019     Resolved Ambulatory Problems     Diagnosis Date Noted    RADHA (acute kidney injury) ([address] 03/12/2013    Malignant hypertension 03/14/2013     Past Medical History:   Diagnosis Date    Acute kidney failure, unspecified ([address]     BBB (bundle branch block) 09/2017    Cancer ([address]     Depression     Hemangioma of skin     History of esophageal dilatation     History of fracture of foot right    Hypertonicity of bladder     Hypertrophy of prostate with urinary obstruction and other lower urinary tract symptoms (LUTS)     Leg pain     Osteoarthrosis, unspecified whether generalized or localized, unspecified site     Unspecified disorder of kidney and ureter 2008       CURRENT MEDICATIONS:    Current Outpatient Medications:     aspirin 81 mg chewable tablet, Take 81 mg by mouth daily. , Disp: , Rfl:     metoprolol succinate (TOPROL-XL) 25 mg XL tablet, Take 25 mg by mouth daily. , Disp: , Rfl:     testosterone cypionate (DEPOTESTOTERONE CYPIONATE) 200 mg/mL injection, 200 mg by IntraMUSCular route every fourteen (14) days. , Disp: , Rfl:     pravastatin (PRAVACHOL) 20 mg tablet, Take 20 mg by mouth nightly., Disp: , Rfl:     traMADol (ULTRAM) 50 mg tablet, Take 1 Tab by mouth every six (6) hours as needed for Pain. Max Daily Amount: 200 mg. (Patient taking differently: Take 50 mg by mouth every six (6) hours as needed for Pain. Take / use AM day of surgery  per anesthesia protocols. Indications: PAIN), Disp: 120 Tab, Rfl: 5    amLODIPine (NORVASC) 10 mg tablet, Take 1 Tab by mouth daily. (Patient taking differently: Take 10 mg by mouth nightly. Indications: Hypertension), Disp: 90 Tab, Rfl: 1    lisinopril (PRINIVIL, ZESTRIL) 40 mg tablet, Take 1 Tab by mouth every evening., Disp: 90 Tab, Rfl: 1    omeprazole (PRILOSEC) 20 mg capsule, Take 1 Cap by mouth daily. (Patient taking differently: Take 20 mg by mouth nightly. Indications: GASTROESOPHAGEAL REFLUX), Disp: 90 Cap, Rfl: 1    MULTI-VITAMIN PO, Take  by mouth every morning. Indications: did not hold, Disp: , Rfl:     OMEGA 3 PO, Take 1 Cap by mouth nightly. Last dose 9/20/17  Indications: hold as of today, Disp: , Rfl:      Date Last Reviewed:  8/15/2019   Number of Personal Factors/Comorbidities that affect the Plan of Care: 0: LOW COMPLEXITY   EXAMINATION:   OBSERVATION/ORTHOSTATIC POSTURAL ASSESSMENT:     -Pt sits with forward head and rounded shoulders which indicate tight anterior chest musculature, upper trapezius, and levator scapula and weak posterior scapula musculature and deep cervical flexors. Pt displays decreased core motor control indicating weak core and low back musculature.       BALANCE Date: 7/10/2019 Date: 8/15/2019   Right 30s 1min   Left 30s 1min     PELVIC COMPONENT Date:   7/10/2019 Date:  8/15/2019   Standing Erik level level   Standing PSIS level level   Standing Sacral Sulci Decreased mobility with lateral flexion slightly Decreased mobility with lateral flexion   Gillet NT NT   Seated Erik level level   Seated PSIS level level   Supine-to-Sit leg length NT NT       AROM/PROM         Joint: Date: 7/10/2019  Date: 8/15/2019  Date:    Active LE ROM Right Left Right Left Right Left   Hip Flexion [facility] WFL     Hip Extension [facility] WFL     Hip ER [facility] WFL WFL     Hip IR WFL WFL WFL WFL     Knee Extension [facility] WFL WFL WFL     Knee Flexion WFL [facility] WFL WFL     Knee Extension [facility] WFL WFL WFL     Lumbar Flexion 55 degrees -- 70 degrees --     Lumbar Extension 10 degrees -- 20 degrees --     Lumbar Side-Bending 15 degrees 10 degrees 15 degrees 15 degrees     Lumbar Rotation 75% limited 75% limited [facility] WFL       STRENGTH         Joint: Date: 7/10/2019  Date: 8/15/2019  Date:     Right Left Right Left Right Left   Hip Abduction 4+/5 4/5 4+/5 4+/5     Hip Adduction 4+/5 4/5 4+/5 4+/5     Hip IR 4+/5 4/5 4+/5 4+/5     Hip ER 4+/5 4/5 4+/5 4+/5     Hip Flexion 4+/5 4/5 4+/5 4+/5     Knee Extension 4+/5 4+/5 4+/5 4+/5     Knee Flexion 4+/5 4+/5 4+/5 4+/5     Ankle DF 4+/5 4+/5 4+/5 4+/5     Ankle PF 4+/5 4+/5 4+/5 4+/5     Ankle IV 4+/5 4+/5 4+/5 4+/5     Ankle EV 4+/5 4+/5 4+/5 4+/5         PALPATION Date:  7/10/2019 Date: 8/15/2019 Date:   TTP R QL, B paraspinals, L glute med, L piriformis No tenderness    TONE B paraspinals, B QL, L glute med, L piriformis R QL, R glute med    SOO SALAMANCA Hypomobile due to surgery Hypomobile due to surgery      SPECIAL TESTS: Assessed @ Initial Visit    -SCOUR: Positive L   -90/90: NT    -R:    -L:   -MIKALA 4: Positive tightness B   -SLUMP TEST: NT   -DEEP SQUAT: NT   -LUMBAR STENOSIS: No      -Bilateral symptoms:       -Leg pain more than back pain:       -Pain during walking/standing:       -Pain relief upon sitting:       -Age greater than 48 years:       NEUROLOGICAL SCREEN: Assessed @ Initial Visit    -RADIATING SYMPTOMS: No     -DERMATOMES: Normal and equal B    -MYOTOMES Date: 7/10/2019     Right Left   L2 & L3   (Hip Flexors) 5/5 5/5   L3-L4  (Knee Extensors) 5/5 5/5   L4  (Ankle DFs) 5/5 5/5   L5  (Hallux Ext) 5/5 5/5   L5-S1  (Ankle PFs) 5/5 5/5   S1-S2  (Ankle EVs) 5/5 5/5     -REFLEXES: Date: 7/10/2019     Right Left   L4  (Quadriceps) NT NT   S1  (Achilles) NT NT     FINDINGS  Date: 7/10/2019   Primary Disc Flattened Back Yes   Radiographic Instability Excessive Lordosis no   SI Joint Dysfunction Flattened Back yes   Secondary Disc (DJD) Hypertrophic Supraspinous Ligament: Thickening along spinous process.  no   Spine Stenosis Flattened Back yes   Facet Impingement Flexed back, usually unilateral no   Nerve Root Adhesion Bad posture, avoid forward flexion, stand with knees bent no     RED FLAGS: Date: 7/10/2019   Non-Mechanical pain distribution (cannot be produced, changed, or reduced during exam): NO   Cauda Equina Dysfunction: NO   Upper lumbar disc herniation in younger patients (femoral nerve tension test for lateral disc herniation in lower lumbar): NO   Lumbar compression fracture (age > 48, trauma, corticosteroid use NO   Spine Cancer (age > 48, pervious history of cancer, failure to improve in 1 month of therapy, no relief - be rest, duration > 1 month, unexplained weight loss, insidious onset, constitutional symptoms): NO   Ankylosing Spondylitis (age < 36, pain not relieved by supine, morning back stiffness, pain duration > 3 months, improved by exercise): NO   Sacral Fracture: NO       FUNCTIONAL MOBILITY:  Assessed @ 8/15/2019   -Pt able to return to gym and performing activities at home such as gardening and putting his socks on.   -Ambulation/Gait: Stiff torso   -Bed mobility: no difficulty   -Stairs: no difficulty   -Transfers: no difficulty   -Wheelchair: N/A     Body Structures Involved:  1. Bones  2. Joints  3. Muscles  4. Ligaments Body Functions Affected:  1. Sensory/Pain  2. Neuromusculoskeletal  3. Movement Related Activities and Participation Affected:  1. Mobility  2. Self Care  3. Domestic Life  4. Interpersonal Interactions and Relationships  5. Community, Social and [place]   Number of elements that affect the Plan of Care: 4+: HIGH COMPLEXITY   CLINICAL PRESENTATION:   Presentation: Stable and uncomplicated: LOW COMPLEXITY   CLINICAL DECISION MAKING:   OUTCOME MEASURE USED:   Tool Used: Tool Used: Modified Oswestry Low Back Pain Questionnaire  Score:  Initial: 13/50  Most Recent: 3/50 (Date: 8/15/2019 )   Interpretation of Score: Each section is scored on a 0-5 scale, 5 representing the greatest disability. The scores of each section are added together for a total score of 50. [place] / [place] A [place] B / Product Type: Medicare /     MEDICAL NECESSITY:  · Skilled intervention continues to be required due to above deficits affecting participation in basic ADLs and overall functional tolerance. REASON FOR SERVICES/OTHER COMMENTS:  · Patient continues to require skilled intervention due to  above deficits affecting participation in basic ADLs and overall functional tolerance. Use of outcome tool(s) and clinical judgement create a POC that gives a: Clear prediction of patient's progress: LOW COMPLEXITY        TREATMENT/SESSION ASSESSMENT:  Camille Mera verbalized understanding of role of PT and POC. · Pain/ Symptoms: Initial:   0/10 Post Session:  0/10 ·   Compliance with Program/Exercises: Will assess as treatment progresses. · Recommendations/Intent for next treatment session: \"Next visit will focus on advancements to more challenging activities\". Total Treatment Duration:  PT Patient Time In/Time Out  Time In: 1015  Time Out: [address]  Lidia Farnsworth, PT, DPT, COMT

## 2020-05-29 ENCOUNTER — HOSPITAL ENCOUNTER (OUTPATIENT)
Dept: SLEEP MEDICINE | Age: 65
Discharge: HOME | End: 2020-05-29
Payer: MEDICARE

## 2020-05-29 PROCEDURE — 95806 SLEEP STUDY UNATT&RESP EFFT: CPT

## 2020-06-28 ENCOUNTER — HOSPITAL ENCOUNTER (OUTPATIENT)
Dept: SLEEP MEDICINE | Age: 65
Discharge: HOME | End: 2020-06-28
Payer: MEDICARE

## 2020-06-28 PROCEDURE — 95811 POLYSOM 6/>YRS CPAP 4/> PARM: CPT

## 2022-03-18 PROBLEM — M48.00 SPINAL STENOSIS: Status: ACTIVE | Noted: 2019-05-24

## 2022-03-19 PROBLEM — J98.4 RESTRICTIVE LUNG DISEASE: Status: ACTIVE | Noted: 2017-11-28

## 2022-03-19 PROBLEM — M48.061 SPINAL STENOSIS OF LUMBAR REGION AT MULTIPLE LEVELS: Status: ACTIVE | Noted: 2019-05-24

## 2022-03-19 PROBLEM — G47.33 OSA (OBSTRUCTIVE SLEEP APNEA): Status: ACTIVE | Noted: 2017-11-28

## 2022-03-19 PROBLEM — J44.9 COPD, MILD (HCC): Status: ACTIVE | Noted: 2017-11-28

## 2022-04-25 PROBLEM — Z78.9 DIFFICULTY WITH CPAP USE: Status: ACTIVE | Noted: 2022-04-25

## 2022-06-29 NOTE — PROGRESS NOTES
[address] [place] [address] Court, [address] [facility]  [phone]    Patient Name:  Fatemeh lFower  YOB: 1955      Office Visit 6/30/2022    CHIEF COMPLAINT:    Chief Complaint   Patient presents with    Follow-up    Sleep Apnea         HISTORY OF PRESENT ILLNESS:         This patient is seen today for follow-up of sleep apnea. He was on CPAP at 7 cm with a EPR of 1. He  had inspire device placed by Dr. Karsten Stanton. He denies any significant problem related to his device so far and everything seems to be healing well. The patient  underwent the proper education about how to activate his device last office visit and how to use his remote for his device. The activation process includes to evaluate the best sensitivity threshold in the range. His sensitivity threshold was noted at 0.9 V for sensation  and a functional threshold is 1.0 V. His waveform was adequate as noted below. He was instructed how to use his remote with the time he is using his device during sleep and turn it off when he is awake. He verbalized understanding. Since his last office visit the patient demonstrated excellent use of his inspire device with average use is 8.7 hours nightly. His amplitude was decreased from 1.8 to 1.5 V today and his control range was between 0.9 to 1.9 V. He requested to increase the duration of his sleep therapy from 8 to 10 hours which was done today. At this point he is comfortable with using the device and he is ready to have another in lab study to ensure that his device is the optimal setting. The Meacham score is 0.                       Past Medical History:   Diagnosis Date    Acute kidney failure, unspecified ([address]     hx of r/t ibuprofen     MELANIE (acute kidney injury) ([address] 3/12/2013    Arthritis of left shoulder region left    has bone spur     BBB (bundle branch block) 09/2017    Right    Cancer ([address]     Basal cell on back    COPD, mild ([address] 11/28/2017 hemangioma in left leg    UPPER GASTROINTESTINAL ENDOSCOPY      esophageal dilation       [unfilled]        Social History     Socioeconomic History    Marital status:      Spouse name: Not on file    Number of children: Not on file    Years of education: Not on file    Highest education level: Not on file   Occupational History    Not on file   Tobacco Use    Smoking status: Former Smoker     Packs/day: 1.00     Quit date: 1994     Years since quittin.5    Smokeless tobacco: Never Used    Tobacco comment: Quit smoking: Restarted somking after 10/16, and again 2017.quit age 42-started back X 1 yr   Substance and Sexual Activity    Alcohol use: Yes     Alcohol/week: 6.0 standard drinks    Drug use: Not Currently     Types: Marijuana Gaynelle Richford)    Sexual activity: Not on file   Other Topics Concern    Not on file   Social History Narrative    Not on file     Social Determinants of Health     Financial Resource Strain:     Difficulty of Paying Living Expenses: Not on file   Food Insecurity:     Worried About Running Out of Food in the Last Year: Not on file    Isabel of Food in the Last Year: Not on file   Transportation Needs:     Lack of Transportation (Medical): Not on file    Lack of Transportation (Non-Medical):  Not on file   Physical Activity:     Days of Exercise per Week: Not on file    Minutes of Exercise per Session: Not on file   Stress:     Feeling of Stress : Not on file   Social Connections:     Frequency of Communication with Friends and Family: Not on file    Frequency of Social Gatherings with Friends and Family: Not on file    Attends Anglican Services: Not on file    Active Member of Clubs or Organizations: Not on file    Attends Club or Organization Meetings: Not on file    Marital Status: Not on file   Intimate Partner Violence:     Fear of Current or Ex-Partner: Not on file    Emotionally Abused: Not on file    Physically Abused: Not on file   Alie Hernandez Sexually Abused: Not on file   Housing Stability:     Unable to Pay for Housing in the Last Year: Not on file    Number of Places Lived in the Last Year: Not on file    Unstable Housing in the Last Year: Not on file         Family History   Problem Relation Age of Onset    Cancer Father         skin    Cancer Brother         colon    Hypertension Mother    [facility] Rheum Arthritis Sister     Cancer Sister         colon    Osteoarthritis Mother          Allergies   Allergen Reactions    Ibuprofen Other (See Comments)     Antiflammatory. - messed with his kidneys, elevated BP         Current Outpatient Medications   Medication Sig    Multiple Vitamin (MULTI-VITAMIN PO) Take by mouth    Omega-3 Fatty Acids (OMEGA 3 PO) Take 1 capsule by mouth    albuterol sulfate (PROAIR RESPICLICK) 572 (90 Base) MCG/ACT aerosol powder inhalation Inhale 180 mcg into the lungs    amLODIPine (NORVASC) 10 MG tablet Take 10 mg by mouth daily    aspirin 81 MG chewable tablet Take 81 mg by mouth daily    lisinopril (PRINIVIL;ZESTRIL) 40 MG tablet Take 40 mg by mouth every evening    metoprolol succinate (TOPROL XL) 25 MG extended release tablet Take 25 mg by mouth daily    omeprazole (PRILOSEC) 20 MG delayed release capsule Take 20 mg by mouth daily    pravastatin (PRAVACHOL) 20 MG tablet Take 20 mg by mouth    testosterone cypionate (DEPOTESTOTERONE CYPIONATE) 200 MG/ML injection Inject 200 mg into the muscle every 14 days.  traMADol (ULTRAM) 50 MG tablet Take 50 mg by mouth every 6 hours as needed. No current facility-administered medications for this visit. REVIEW OF SYSTEMS:   CONSTITUTIONAL:   There is no history of fever, chills, night sweats, weight loss, weight gain, persistent fatigue, or lethargy/hypersomnolence. CARDIAC:   No chest pain, pressure, discomfort, palpitations, orthopnea, murmurs, or edema.    GI:   No dysphagia, heartburn reflux, nausea/vomiting, diarrhea, abdominal pain, or bleeding. NEURO:   There is no history of AMS, persistent headache, decreased level of consciousness, seizures, or motor or sensory deficits. PHYSICAL EXAM:    Vitals:    06/30/22 1055   BP: 110/72   Pulse: 84   Resp: 14   Temp: 97.7 °F (36.5 °C)   SpO2: 94%        GENERAL APPEARANCE:   The patient is normal weight and in no respiratory distress. HEENT:   PERRL. Conjunctivae unremarkable. Nasal mucosa is without epistaxis, exudate, or polyps. Gums and dentition are unremarkable. There is oropharyngeal narrowing. TMs are clear. NECK/LYMPHATIC:   Symmetrical with no elevation of jugular venous pulsation. Trachea midline. No thyroid enlargement. No cervical adenopathy. LUNGS:   Normal respiratory effort with symmetrical lung expansion. Breath sounds are diminished in the bases. HEART:   There is a regular rate and rhythm. No murmur, rub, or gallop. There is no edema in the lower extremities. ABDOMEN:   Soft and non-tender. No hepatosplenomegaly. Bowel sounds are normal.     NEURO:   The patient is alert and oriented to person, place, and time. Memory appears intact and mood is normal.  No gross sensorimotor deficits are present. ASSESSMENT:  (Medical Decision Making)      Diagnosis Orders   1. TERENCE (obstructive sleep apnea), currently using inspire device and he is demonstrating excellent compliance with that. He will be scheduled for final titration study for his inspire device   Ambulatory Referral to Sleep Studies   2.  Difficulty with CPAP use, this required inspire device as an alternative Ambulatory Referral to Sleep Studies        PLAN:      The setting of his inspire device was adjusted as noted above and his duration of therapy was increased from 8 to 10 hours at his request.    Continue proper sleep hygiene    Continue positional therapy    Schedule the patient for an in lab study to titrate his inspire device to the optimal setting    Return to the sleep center in 3-4 months or sooner if needed      Orders Placed This Encounter   Procedures    Ambulatory Referral to Sleep Studies     Referral Priority:   Routine     Referral Type:   Consult for Advice and Opinion     Referral Reason:   Specialty Services Required     Requested Specialty:   Sleep Center     Number of Visits Requested:   1      No orders of the defined types were placed in this encounter. Over 50% of today's office visit was spent in face to face time reviewing test results, prognosis, importance of compliance, education about disease process, benefits of medications, instructions for management of acute flare-ups, and follow up plans. Total face to face time spent with patient charting was 30 minutes.         Lucille Camp MD  Electronically signed

## 2022-06-30 ENCOUNTER — OFFICE VISIT (OUTPATIENT)
Dept: SLEEP MEDICINE | Age: 67
End: 2022-06-30
Payer: MEDICARE

## 2022-06-30 VITALS
RESPIRATION RATE: 14 BRPM | HEART RATE: 84 BPM | TEMPERATURE: 97.7 F | DIASTOLIC BLOOD PRESSURE: 72 MMHG | SYSTOLIC BLOOD PRESSURE: 110 MMHG | BODY MASS INDEX: 28.17 KG/M2 | OXYGEN SATURATION: 94 % | WEIGHT: 208 LBS | HEIGHT: 72 IN

## 2022-06-30 DIAGNOSIS — Z78.9 DIFFICULTY WITH CPAP USE: ICD-10-CM

## 2022-06-30 DIAGNOSIS — G47.33 OSA (OBSTRUCTIVE SLEEP APNEA): Primary | ICD-10-CM

## 2022-06-30 PROCEDURE — G8417 CALC BMI ABV UP PARAM F/U: HCPCS | Performed by: INTERNAL MEDICINE

## 2022-06-30 PROCEDURE — 3017F COLORECTAL CA SCREEN DOC REV: CPT | Performed by: INTERNAL MEDICINE

## 2022-06-30 PROCEDURE — 1123F ACP DISCUSS/DSCN MKR DOCD: CPT | Performed by: INTERNAL MEDICINE

## 2022-06-30 PROCEDURE — 99214 OFFICE O/P EST MOD 30 MIN: CPT | Performed by: INTERNAL MEDICINE

## 2022-06-30 PROCEDURE — 1036F TOBACCO NON-USER: CPT | Performed by: INTERNAL MEDICINE

## 2022-06-30 PROCEDURE — G8427 DOCREV CUR MEDS BY ELIG CLIN: HCPCS | Performed by: INTERNAL MEDICINE

## 2022-06-30 ASSESSMENT — SLEEP AND FATIGUE QUESTIONNAIRES
ESS TOTAL SCORE: 0
HOW LIKELY ARE YOU TO NOD OFF OR FALL ASLEEP WHILE SITTING AND READING: 0
HOW LIKELY ARE YOU TO NOD OFF OR FALL ASLEEP IN A CAR, WHILE STOPPED FOR A FEW MINUTES IN TRAFFIC: 0
HOW LIKELY ARE YOU TO NOD OFF OR FALL ASLEEP WHILE WATCHING TV: 0
HOW LIKELY ARE YOU TO NOD OFF OR FALL ASLEEP WHILE SITTING QUIETLY AFTER LUNCH WITHOUT ALCOHOL: 0
HOW LIKELY ARE YOU TO NOD OFF OR FALL ASLEEP WHILE SITTING INACTIVE IN A PUBLIC PLACE: 0
HOW LIKELY ARE YOU TO NOD OFF OR FALL ASLEEP WHEN YOU ARE A PASSENGER IN A CAR FOR AN HOUR WITHOUT A BREAK: 0
HOW LIKELY ARE YOU TO NOD OFF OR FALL ASLEEP WHILE LYING DOWN TO REST IN THE AFTERNOON WHEN CIRCUMSTANCES PERMIT: 0
HOW LIKELY ARE YOU TO NOD OFF OR FALL ASLEEP WHILE SITTING AND TALKING TO SOMEONE: 0

## 2022-11-16 ENCOUNTER — OFFICE VISIT (OUTPATIENT)
Dept: PULMONOLOGY | Age: 67
End: 2022-11-16
Payer: MEDICARE

## 2022-11-16 VITALS
TEMPERATURE: 98 F | BODY MASS INDEX: 28.17 KG/M2 | RESPIRATION RATE: 20 BRPM | OXYGEN SATURATION: 94 % | SYSTOLIC BLOOD PRESSURE: 120 MMHG | DIASTOLIC BLOOD PRESSURE: 80 MMHG | HEART RATE: 71 BPM | HEIGHT: 72 IN | WEIGHT: 208 LBS

## 2022-11-16 DIAGNOSIS — R05.3 CHRONIC COUGH: ICD-10-CM

## 2022-11-16 DIAGNOSIS — R06.09 DYSPNEA ON EXERTION: ICD-10-CM

## 2022-11-16 DIAGNOSIS — R06.2 WHEEZING: ICD-10-CM

## 2022-11-16 DIAGNOSIS — J44.9 CHRONIC OBSTRUCTIVE PULMONARY DISEASE, UNSPECIFIED COPD TYPE (HCC): Primary | ICD-10-CM

## 2022-11-16 DIAGNOSIS — G47.33 OBSTRUCTIVE SLEEP APNEA (ADULT) (PEDIATRIC): ICD-10-CM

## 2022-11-16 LAB
EXPIRATORY TIME: NORMAL
FEF 25-75% %PRED-PRE: NORMAL
FEF 25-75% PRED: NORMAL
FEF 25-75%-PRE: NORMAL
FEV1 %PRED-PRE: 62 %
FEV1 PRED: NORMAL
FEV1/FVC %PRED-PRE: NORMAL
FEV1/FVC PRED: NORMAL
FEV1/FVC: 75 %
FEV1: 2.19 L
FVC %PRED-PRE: 60 %
FVC PRED: NORMAL
FVC: 2.91 L
PEF %PRED-PRE: NORMAL
PEF PRED: NORMAL
PEF-PRE: NORMAL

## 2022-11-16 PROCEDURE — G8427 DOCREV CUR MEDS BY ELIG CLIN: HCPCS | Performed by: INTERNAL MEDICINE

## 2022-11-16 PROCEDURE — 1123F ACP DISCUSS/DSCN MKR DOCD: CPT | Performed by: INTERNAL MEDICINE

## 2022-11-16 PROCEDURE — G8484 FLU IMMUNIZE NO ADMIN: HCPCS | Performed by: INTERNAL MEDICINE

## 2022-11-16 PROCEDURE — 1036F TOBACCO NON-USER: CPT | Performed by: INTERNAL MEDICINE

## 2022-11-16 PROCEDURE — 3078F DIAST BP <80 MM HG: CPT | Performed by: INTERNAL MEDICINE

## 2022-11-16 PROCEDURE — 3074F SYST BP LT 130 MM HG: CPT | Performed by: INTERNAL MEDICINE

## 2022-11-16 PROCEDURE — 3023F SPIROM DOC REV: CPT | Performed by: INTERNAL MEDICINE

## 2022-11-16 PROCEDURE — 94010 BREATHING CAPACITY TEST: CPT | Performed by: INTERNAL MEDICINE

## 2022-11-16 PROCEDURE — 99214 OFFICE O/P EST MOD 30 MIN: CPT | Performed by: INTERNAL MEDICINE

## 2022-11-16 PROCEDURE — G8417 CALC BMI ABV UP PARAM F/U: HCPCS | Performed by: INTERNAL MEDICINE

## 2022-11-16 PROCEDURE — 3017F COLORECTAL CA SCREEN DOC REV: CPT | Performed by: INTERNAL MEDICINE

## 2022-11-16 RX ORDER — ALBUTEROL SULFATE 90 UG/1
2 AEROSOL, METERED RESPIRATORY (INHALATION) EVERY 6 HOURS PRN
Qty: 1 EACH | Refills: 11 | Status: SHIPPED | OUTPATIENT
Start: 2022-11-16

## 2022-11-16 ASSESSMENT — PULMONARY FUNCTION TESTS
FVC_PERCENT_PREDICTED_PRE: 60
FEV1_PERCENT_PREDICTED_PRE: 62
FEV1/FVC: 75
FEV1: 2.19
FVC: 2.91

## 2022-11-22 NOTE — PROGRESS NOTES
[address] [facility], [address] [facility]  [phone]    Patient Name:  Zabrina Ashby  YOB: 1955      Office Visit 11/16/2022    CHIEF COMPLAINT:    Chief Complaint   Patient presents with    COPD         HISTORY OF PRESENT ILLNESS:    Previously seen by: Dr. Hi Wood MD on 09/15/21  The patient is a 25-year-old white male who is seen as a work in appointment for abnormal chest CT. He has known mild COPD. He had had ongoing chest wall pain. Cough, and congestion that have been present for years. Recent CT of chest done was done to evaluate these symptoms by his PCP. [facility] revealed multiple mediastinal nodes, largest 7 mm without any other findings. Patient reports chronic mucus production that he feels is related to his CPAP \"pushing mucus into my lungs\". Denies any fever, chills, or night sweats. Weight is stable. No hemoptysis. Recent IgA level was significantly elevated and he has upcoming  appointment with oncology. PLAN:   The patient has normal mediastinal lymph nodes that are less than 1 cm in size. They remain stable and not enlarging, the patient has no symptoms whatsoever and therefore no further scanning or  work-up is indicated at this time. His COPD is mild and asymptomatic and he is off all controlled medications without any worsening symptoms. He is up-to-date on his vaccination for Covid. He is GOLD stage 0 at risk with normal FEV1 and FVC and currently  no symptoms whatsoever. He will continue to refrain from using any medications unless he becomes symptomatic, he would like to return to the office for follow-up in 1 years time.   November 16, 2022:    The patient has done relatively well however he states that has become much more symptomatic from the standpoint of his respiratory status, he has intermittent wheezing, coughing, shortness of breath on more than usual exertion. He has been placed on Symbicort by his primary doctor but that did not really help him in fact caused him to have more coughing and more mucus production and he stopped using it. He uses albuterol intermittently. He continues to abstain from smoking cigarettes, on his last visit a year ago he was deemed to be GOLD stage 0 at risk, he does have restrictive appearance on his spirometry today with some scooping at end expiration suggestive of dynamic airway collapse but really with his history of smoking and symmetric decline of FEV1 and FVC with a preserved ratio he would qualify to be undetermined at gold stage with preserved ratio impaired spirometry known as PRISM.     Past Medical History:   Diagnosis Date    Acute kidney failure, unspecified ([address]     hx of r/t ibuprofen     MELANIE (acute kidney injury) ([address] 3/12/2013    Arthritis of left shoulder region left    has bone spur     BBB (bundle branch block) 09/2017    Right    Cancer ([address]     Basal cell on back    COPD, mild ([address] 11/28/2017    Depression     patient denies    Dyslipidemia     GERD (gastroesophageal reflux disease)     controlled with med    Hemangioma of skin     left leg/ upper thigh    History of esophageal dilatation     due to stricture    History of fracture of foot right    may 10th seen at  and 2month later reoccurred and has bad arthritis on MCP     HTN (hypertension)     Hypertonicity of bladder     states \"overactive bladder\"    Hypertrophy of prostate with urinary obstruction and other lower urinary tract symptoms (LUTS)     denies    Impotence of organic origin     Leg pain     left leg    Low serum testosterone level     TERENCE (obstructive sleep apnea) 11/28/2017    No CPAP    Osteoarthrosis, unspecified whether generalized or localized, unspecified site     back/ R great toe    Unspecified disorder of kidney and ureter 2008    states related to bp          Patient Active Problem List   Diagnosis    Low serum testosterone level    Spinal stenosis    Restrictive lung disease    Esophageal stricture    Arthritis, multiple joint involvement    Benign non-nodular prostatic hyperplasia with lower urinary tract symptoms    Dyslipidemia    Benign tumor of skin of leg    Status post total replacement of left shoulder    Spinal stenosis of lumbar region at multiple levels    TERENCE (obstructive sleep apnea)    COPD, mild (HCC)    Impotence of organic origin    Abnormal EKG    Arthritis of left shoulder region    Hypertension    History of colonic polyps    GERD (gastroesophageal reflux disease)    Difficulty with CPAP use           Past Surgical History:   Procedure Laterality Date    COLONOSCOPY      HEENT      sinus    ORTHOPEDIC SURGERY Right 2016    plate to right great toe    OTHER SURGICAL HISTORY Left 2016    hemangioma repair - thigh    SHOULDER ARTHROPLASTY Left 2015    SHOULDER ARTHROSCOPY Left     TUMOR REMOVAL Left 50 years ago    left thigh had debulking procedure x 2 for hemangioma in left leg    UPPER GASTROINTESTINAL ENDOSCOPY      esophageal dilation         Social History     Socioeconomic History    Marital status:      Spouse name: Not on file    Number of children: Not on file    Years of education: Not on file    Highest education level: Not on file   Occupational History    Not on file   Tobacco Use    Smoking status: Former     Packs/day: 3.00     Years: 30.00     Pack years: 90.00     Types: Cigarettes     Quit date: 1994     Years since quittin.8    Smokeless tobacco: Never    Tobacco comments:     Quit smoking: Restarted somking after 10/16, and again 2017.quit age 42-started back X 1 yr   Substance and Sexual Activity    Alcohol use:  Yes     Alcohol/week: 6.0 standard drinks    Drug use: Not Currently     Types: Marijuana Nan Zelda)    Sexual activity: Not on file   Other Topics Concern    Not on file   Social History Narrative    Not on file     Social Determinants of Health Financial Resource Strain: Not on file   Food Insecurity: Not on file   Transportation Needs: Not on file   Physical Activity: Not on file   Stress: Not on file   Social Connections: Not on file   Intimate Partner Violence: Not on file   Housing Stability: Not on file         Family History   Problem Relation Age of Onset    Cancer Father         skin    Cancer Brother         colon    Hypertension Mother     Rheum Arthritis Sister     Cancer Sister         colon    Osteoarthritis Mother          Allergies   Allergen Reactions    Ibuprofen Other (See Comments)     Antiflammatory. - messed with his kidneys, elevated BP         Current Outpatient Medications   Medication Sig    umeclidinium-vilanterol (ANORO ELLIPTA) 62.5-25 MCG/INH AEPB inhaler Inhale 1 puff into the lungs daily    albuterol sulfate HFA (PROVENTIL;VENTOLIN;PROAIR) 108 (90 Base) MCG/ACT inhaler Inhale 2 puffs into the lungs every 6 hours as needed for Wheezing    Multiple Vitamin (MULTI-VITAMIN PO) Take by mouth    Omega-3 Fatty Acids (OMEGA 3 PO) Take 1 capsule by mouth    albuterol sulfate (PROAIR RESPICLICK) 604 (90 Base) MCG/ACT aerosol powder inhalation Inhale 180 mcg into the lungs    amLODIPine (NORVASC) 10 MG tablet Take 10 mg by mouth daily    aspirin 81 MG chewable tablet Take 81 mg by mouth daily    lisinopril (PRINIVIL;ZESTRIL) 40 MG tablet Take 40 mg by mouth every evening    metoprolol succinate (TOPROL XL) 25 MG extended release tablet Take 25 mg by mouth daily    omeprazole (PRILOSEC) 20 MG delayed release capsule Take 20 mg by mouth daily    pravastatin (PRAVACHOL) 20 MG tablet Take 20 mg by mouth    testosterone cypionate (DEPOTESTOTERONE CYPIONATE) 200 MG/ML injection Inject 200 mg into the muscle every 14 days. traMADol (ULTRAM) 50 MG tablet Take 50 mg by mouth every 6 hours as needed. No current facility-administered medications for this visit.            Review of Systems        PHYSICAL EXAM:    Vitals:    11/16/22 1042   BP: 120/80   Pulse: 71   Resp: 20   Temp: 98 °F (36.7 °C)   SpO2: 94%        GENERAL APPEARANCE:   The patient is normal weight and in no respiratory distress. HEENT:   PERRL. Conjunctivae unremarkable. Nasal mucosa is without epistaxis, exudate, or polyps. Gums and dentition are unremarkable. There is no oropharyngeal narrowing. TMs are clear. NECK/LYMPHATIC:   Symmetrical with no elevation of jugular venous pulsation. Trachea midline. No thyroid enlargement. No cervical adenopathy. LUNGS:   Normal respiratory effort with symmetrical lung expansion. Breath sounds are clear to auscultation bilaterally with no rhonchi wheezing or crackles. HEART:   There is a regular rate and rhythm. No murmur, rub, or gallop. There is no edema in the lower extremities. ABDOMEN:   Soft and non-tender. No hepatosplenomegaly. Bowel sounds are normal.     NEURO:   The patient is alert and oriented to person, place, and time. Memory appears intact and mood is normal.  No gross sensorimotor deficits are present. DIAGNOSTIC TESTS:   CT of chest:  Low lung volumes.                  CT of chest with contrast:                       [facility]                  Spirometry:        ASSESSMENT:  (Medical Decision Making)     Patient Active Problem List   Diagnosis    Low serum testosterone level    Spinal stenosis    Restrictive lung disease    Esophageal stricture    Arthritis, multiple joint involvement    Benign non-nodular prostatic hyperplasia with lower urinary tract symptoms    Dyslipidemia    Benign tumor of skin of leg    Status post total replacement of left shoulder    Spinal stenosis of lumbar region at multiple levels    TERENCE (obstructive sleep apnea)    COPD, mild (HCC)    Impotence of organic origin    Abnormal EKG    Arthritis of left shoulder region    Hypertension    History of colonic polyps    GERD (gastroesophageal reflux disease)    Difficulty with CPAP use PLAN:  Encounter Diagnoses   Name Primary? Chronic obstructive pulmonary disease, unspecified COPD type ([address] Yes    Obstructive sleep apnea (adult) (pediatric)     Wheezing     Chronic cough     Dyspnea on exertion    The patient has become much more symptomatic and is worsening, his Gold classification is difficult to pinpoint due to preserved ratio impaired spirometry and therefore we will try to treat him for his symptoms. He cannot tolerate Symbicort as it causes him increased coughing and increased mucus production and therefore we will try an oral Ellipta once daily and intermittent albuterol therapy. In the meantime I would like to perform complete pulmonary function testing to assess his diffusion capacity, he is congratulated on abstaining from cigarette smoking, we will therefore ordered first available complete pulmonary function testing with diffusion capacity and bronchodilator response and see the patient in follow-up while on an oral Ellipta and albuterol in 3 months time. Total time spent 30 minutes    Orders Placed This Encounter   Procedures    Spirometry Without Bronchodilator       Orders Placed This Encounter   Medications    umeclidinium-vilanterol (ANORO ELLIPTA) 62.5-25 MCG/INH AEPB inhaler     Sig: Inhale 1 puff into the lungs daily     Dispense:  1 each     Refill:  11    albuterol sulfate HFA (PROVENTIL;VENTOLIN;PROAIR) 108 (90 Base) MCG/ACT inhaler     Sig: Inhale 2 puffs into the lungs every 6 hours as needed for Wheezing     Dispense:  1 each     Refill:  [address] Moises MD  Dictated using voice recognition software.  Proofread, but unrecognized voice recognition errors may exist. 97

## 2023-01-12 ENCOUNTER — NURSE ONLY (OUTPATIENT)
Dept: PULMONOLOGY | Age: 68
End: 2023-01-12

## 2023-01-12 DIAGNOSIS — J44.9 CHRONIC OBSTRUCTIVE PULMONARY DISEASE, UNSPECIFIED COPD TYPE (HCC): Primary | ICD-10-CM

## 2023-01-12 LAB
FEV 1 , POC: 2.09 L
FEV1 % PRED, POC: 58 %
FEV1/FVC, POC: NORMAL
FVC % PRED, POC: 62 %
FVC, POC: NORMAL

## 2023-01-12 ASSESSMENT — PULMONARY FUNCTION TESTS
FVC_PERCENT_PREDICTED_POC: 62
FEV1_PERCENT_PREDICTED_POC: 58

## 2023-06-19 NOTE — PROGRESS NOTES
[address] [place] [address] Court, [address] [facility]  [phone]    Patient Name:  Diana Land  YOB: 1955      Office Visit 6/21/2023    Chief Complaint   Patient presents with    Follow-up     INSPIRE       HISTORY OF PRESENT ILLNESS:    Patient came in today for a follow-up visit regarding his inspire device. Seen with Bull [facility]    Please note patient does have sleep apnea with an AHI of 28.3 lowest sat is 51%. Patient has device implanted in March 16, 2022 by Dr. Bard Worley. Patient is device activated on April 25, 2022. Patient per notes from 6/30/2023 apparently fell was optimized but did not proceed with in lab study do the cost.    Patient currently reports his car is making noises and not responding properly. He indicates that he is going to bed and sleeping from about 8 to 9 PM but wakes up at about 1 to 2 AM every day and then stays awake in bed until the morning. He indicates as a result he is tossing and turning a lot in bed and just feels he cannot go back to bed. Sometimes his mind is racing. He has tried melatonin and feels it helps but not as much as he would like. His Forsyth score today is 12/24. Last settings were amplitude 1.3  Patient controlled voltage of 0.9-1.9  Pulse of 90  Rate of 33  Delay of 30 minutes  Pause 15 minutes  Duration 10 hours     Compliance data showed he had used it 30 out of 30 days with 29 days more than 4 hours average sleep 9 hours and 52 minutes.           EPWORTH SCALE :  Sleep Medicine 6/19/2023 6/30/2022 4/25/2022   Sitting and reading 2 0 0   Watching TV 2 0 0   Sitting, inactive in a public place (e.g. a theatre or a meeting) 2 0 0   As a passenger in a car for an hour without a break 2 0 0   Lying down to rest in the afternoon when circumstances permit 2 0 0   Sitting and talking to someone 0 0 0   Sitting quietly after a lunch without alcohol 2 0 0   In a car, while stopped for a few minutes in

## 2023-06-21 ENCOUNTER — OFFICE VISIT (OUTPATIENT)
Dept: SLEEP MEDICINE | Age: 68
End: 2023-06-21
Payer: MEDICARE

## 2023-06-21 VITALS
SYSTOLIC BLOOD PRESSURE: 120 MMHG | HEIGHT: 72 IN | WEIGHT: 210.4 LBS | TEMPERATURE: 98.2 F | HEART RATE: 92 BPM | DIASTOLIC BLOOD PRESSURE: 60 MMHG | OXYGEN SATURATION: 94 % | RESPIRATION RATE: 16 BRPM | BODY MASS INDEX: 28.5 KG/M2

## 2023-06-21 DIAGNOSIS — G47.33 OSA (OBSTRUCTIVE SLEEP APNEA): ICD-10-CM

## 2023-06-21 DIAGNOSIS — G47.00 INSOMNIA, UNSPECIFIED TYPE: Primary | ICD-10-CM

## 2023-06-21 PROCEDURE — 3078F DIAST BP <80 MM HG: CPT | Performed by: INTERNAL MEDICINE

## 2023-06-21 PROCEDURE — 3074F SYST BP LT 130 MM HG: CPT | Performed by: INTERNAL MEDICINE

## 2023-06-21 PROCEDURE — G8417 CALC BMI ABV UP PARAM F/U: HCPCS | Performed by: INTERNAL MEDICINE

## 2023-06-21 PROCEDURE — 1036F TOBACCO NON-USER: CPT | Performed by: INTERNAL MEDICINE

## 2023-06-21 PROCEDURE — 3017F COLORECTAL CA SCREEN DOC REV: CPT | Performed by: INTERNAL MEDICINE

## 2023-06-21 PROCEDURE — G8427 DOCREV CUR MEDS BY ELIG CLIN: HCPCS | Performed by: INTERNAL MEDICINE

## 2023-06-21 PROCEDURE — 99215 OFFICE O/P EST HI 40 MIN: CPT | Performed by: INTERNAL MEDICINE

## 2023-06-21 PROCEDURE — 1123F ACP DISCUSS/DSCN MKR DOCD: CPT | Performed by: INTERNAL MEDICINE

## 2023-06-21 PROCEDURE — 95977 ALYS CPLX CN NPGT PRGRMG: CPT | Performed by: INTERNAL MEDICINE

## 2023-06-21 RX ORDER — TRAZODONE HYDROCHLORIDE 50 MG/1
TABLET ORAL
Qty: 60 TABLET | Refills: 3 | Status: SHIPPED | OUTPATIENT
Start: 2023-06-21

## 2023-06-21 RX ORDER — DONEPEZIL HYDROCHLORIDE 5 MG/1
TABLET, FILM COATED ORAL
COMMUNITY
Start: 2023-06-20

## 2023-07-07 ENCOUNTER — HOSPITAL ENCOUNTER (OUTPATIENT)
Age: 68
Setting detail: OBSERVATION
Discharge: HOME | End: 2023-07-08
Attending: EMERGENCY MEDICINE | Admitting: INTERNAL MEDICINE
Payer: MEDICARE

## 2023-07-07 ENCOUNTER — APPOINTMENT (OUTPATIENT)
Dept: GENERAL RADIOLOGY | Age: 68
End: 2023-07-07
Payer: MEDICARE

## 2023-07-07 ENCOUNTER — APPOINTMENT (OUTPATIENT)
Dept: NON INVASIVE DIAGNOSTICS | Age: 68
End: 2023-07-07
Payer: MEDICARE

## 2023-07-07 DIAGNOSIS — R94.31 ABNORMAL EKG: Primary | ICD-10-CM

## 2023-07-07 DIAGNOSIS — Z95.5 S/P PRIMARY ANGIOPLASTY WITH CORONARY STENT: ICD-10-CM

## 2023-07-07 DIAGNOSIS — R07.9 CHEST PAIN, UNSPECIFIED TYPE: ICD-10-CM

## 2023-07-07 DIAGNOSIS — I47.29 NONSUSTAINED VENTRICULAR TACHYCARDIA (HCC): ICD-10-CM

## 2023-07-07 DIAGNOSIS — R07.9 CHEST PAIN: ICD-10-CM

## 2023-07-07 PROBLEM — J98.4 RESTRICTIVE LUNG DISEASE: Status: ACTIVE | Noted: 2017-11-28

## 2023-07-07 PROBLEM — J44.9 COPD, MILD (HCC): Status: ACTIVE | Noted: 2017-11-28

## 2023-07-07 PROBLEM — G47.33 OSA (OBSTRUCTIVE SLEEP APNEA): Status: ACTIVE | Noted: 2017-11-28

## 2023-07-07 LAB
ACT BLD: 221 SECS (ref 70–128)
ACT BLD: 275 SECS (ref 70–128)
ALBUMIN SERPL-MCNC: 3.7 G/DL (ref 3.2–4.6)
ALBUMIN/GLOB SERPL: 0.9 (ref 0.4–1.6)
ALP SERPL-CCNC: 63 U/L (ref 50–136)
ALT SERPL-CCNC: 43 U/L (ref 12–65)
ANION GAP SERPL CALC-SCNC: 5 MMOL/L (ref 2–11)
AST SERPL-CCNC: 39 U/L (ref 15–37)
BILIRUB SERPL-MCNC: 0.9 MG/DL (ref 0.2–1.1)
BUN SERPL-MCNC: 13 MG/DL (ref 8–23)
CALCIUM SERPL-MCNC: 9.4 MG/DL (ref 8.3–10.4)
CHLORIDE SERPL-SCNC: 108 MMOL/L (ref 101–110)
CO2 SERPL-SCNC: 26 MMOL/L (ref 21–32)
CREAT SERPL-MCNC: 1.2 MG/DL (ref 0.8–1.5)
ECHO AO ASC DIAM: 3.3 CM
ECHO AO ASCENDING AORTA INDEX: 1.55 CM/M2
ECHO AO ROOT DIAM: 3.1 CM
ECHO AO ROOT INDEX: 1.46 CM/M2
ECHO AV AREA PEAK VELOCITY: 1.9 CM2
ECHO AV AREA VTI: 1.9 CM2
ECHO AV AREA/BSA PEAK VELOCITY: 0.9 CM2/M2
ECHO AV AREA/BSA VTI: 0.9 CM2/M2
ECHO AV MEAN GRADIENT: 17 MMHG
ECHO AV MEAN VELOCITY: 1.8 M/S
ECHO AV PEAK GRADIENT: 35 MMHG
ECHO AV PEAK VELOCITY: 3 M/S
ECHO AV VELOCITY RATIO: 0.37
ECHO AV VTI: 55.8 CM
ECHO BSA: 2.15 M2
ECHO BSA: 2.15 M2
ECHO LA AREA 2C: 24 CM2
ECHO LA AREA 4C: 20.4 CM2
ECHO LA DIAMETER INDEX: 1.88 CM/M2
ECHO LA DIAMETER: 4 CM
ECHO LA MAJOR AXIS: 6.7 CM
ECHO LA MINOR AXIS: 6.9 CM
ECHO LA TO AORTIC ROOT RATIO: 1.29
ECHO LA VOL 2C: 70 ML (ref 18–58)
ECHO LA VOL 4C: 51 ML (ref 18–58)
ECHO LA VOL BP: 60 ML (ref 18–58)
ECHO LA VOL/BSA BIPLANE: 28 ML/M2 (ref 16–34)
ECHO LA VOLUME INDEX A2C: 33 ML/M2 (ref 16–34)
ECHO LA VOLUME INDEX A4C: 24 ML/M2 (ref 16–34)
ECHO LV FRACTIONAL SHORTENING: 33 % (ref 28–44)
ECHO LV INTERNAL DIMENSION DIASTOLE INDEX: 2.3 CM/M2
ECHO LV INTERNAL DIMENSION DIASTOLIC: 4.9 CM (ref 4.2–5.9)
ECHO LV INTERNAL DIMENSION SYSTOLIC INDEX: 1.55 CM/M2
ECHO LV INTERNAL DIMENSION SYSTOLIC: 3.3 CM
ECHO LV IVSD: 1.1 CM (ref 0.6–1)
ECHO LV MASS 2D: 188.1 G (ref 88–224)
ECHO LV MASS INDEX 2D: 88.3 G/M2 (ref 49–115)
ECHO LV POSTERIOR WALL DIASTOLIC: 1 CM (ref 0.6–1)
ECHO LV RELATIVE WALL THICKNESS RATIO: 0.41
ECHO LVOT AREA: 4.9 CM2
ECHO LVOT AV VTI INDEX: 0.39
ECHO LVOT DIAM: 2.5 CM
ECHO LVOT MEAN GRADIENT: 2 MMHG
ECHO LVOT PEAK GRADIENT: 5 MMHG
ECHO LVOT PEAK VELOCITY: 1.1 M/S
ECHO LVOT STROKE VOLUME INDEX: 50.7 ML/M2
ECHO LVOT SV: 107.9 ML
ECHO LVOT VTI: 22 CM
ECHO PV ACCELERATION TIME (AT): 120 MS
ECHO PV MAX VELOCITY: 1.5 M/S
ECHO PV PEAK GRADIENT: 9 MMHG
ECHO RV BASAL DIMENSION: 3.2 CM
ECHO RV INTERNAL DIMENSION: 4.1 CM
EKG ATRIAL RATE: 50 BPM
EKG DIAGNOSIS: NORMAL
EKG P AXIS: 50 DEGREES
EKG P-R INTERVAL: 150 MS
EKG Q-T INTERVAL: 410 MS
EKG QRS DURATION: 146 MS
EKG QTC CALCULATION (BAZETT): 537 MS
EKG R AXIS: -58 DEGREES
EKG T AXIS: 18 DEGREES
EKG VENTRICULAR RATE: 103 BPM
ERYTHROCYTE [DISTWIDTH] IN BLOOD BY AUTOMATED COUNT: 13.2 % (ref 11.9–14.6)
GLOBULIN SER CALC-MCNC: 3.9 G/DL (ref 2.8–4.5)
GLUCOSE BLD STRIP.AUTO-MCNC: 101 MG/DL (ref 65–100)
GLUCOSE SERPL-MCNC: 120 MG/DL (ref 65–100)
HCT VFR BLD AUTO: 50.2 % (ref 41.1–50.3)
HGB BLD-MCNC: 16.6 G/DL (ref 13.6–17.2)
MAGNESIUM SERPL-MCNC: 2.1 MG/DL (ref 1.8–2.4)
MCH RBC QN AUTO: 32.2 PG (ref 26.1–32.9)
MCHC RBC AUTO-ENTMCNC: 33.1 G/DL (ref 31.4–35)
MCV RBC AUTO: 97.3 FL (ref 82–102)
NRBC # BLD: 0 K/UL (ref 0–0.2)
NT PRO BNP: 216 PG/ML (ref 5–125)
PLATELET # BLD AUTO: 188 K/UL (ref 150–450)
PMV BLD AUTO: 11 FL (ref 9.4–12.3)
POTASSIUM SERPL-SCNC: 3.9 MMOL/L (ref 3.5–5.1)
PROT SERPL-MCNC: 7.6 G/DL (ref 6.3–8.2)
RBC # BLD AUTO: 5.16 M/UL (ref 4.23–5.6)
SERVICE CMNT-IMP: ABNORMAL
SODIUM SERPL-SCNC: 139 MMOL/L (ref 133–143)
TROPONIN I SERPL HS-MCNC: 13 PG/ML (ref 0–14)
TSH, 3RD GENERATION: 2.51 UIU/ML (ref 0.36–3.74)
WBC # BLD AUTO: 6.4 K/UL (ref 4.3–11.1)

## 2023-07-07 PROCEDURE — G0378 HOSPITAL OBSERVATION PER HR: HCPCS

## 2023-07-07 PROCEDURE — 6370000000 HC RX 637 (ALT 250 FOR IP): Performed by: EMERGENCY MEDICINE

## 2023-07-07 PROCEDURE — 80053 COMPREHEN METABOLIC PANEL: CPT

## 2023-07-07 PROCEDURE — 85347 COAGULATION TIME ACTIVATED: CPT

## 2023-07-07 PROCEDURE — 99152 MOD SED SAME PHYS/QHP 5/>YRS: CPT | Performed by: INTERNAL MEDICINE

## 2023-07-07 PROCEDURE — 99223 1ST HOSP IP/OBS HIGH 75: CPT | Performed by: INTERNAL MEDICINE

## 2023-07-07 PROCEDURE — 84443 ASSAY THYROID STIM HORMONE: CPT

## 2023-07-07 PROCEDURE — 2500000003 HC RX 250 WO HCPCS: Performed by: INTERNAL MEDICINE

## 2023-07-07 PROCEDURE — 85027 COMPLETE CBC AUTOMATED: CPT

## 2023-07-07 PROCEDURE — C1769 GUIDE WIRE: HCPCS | Performed by: INTERNAL MEDICINE

## 2023-07-07 PROCEDURE — 93306 TTE W/DOPPLER COMPLETE: CPT

## 2023-07-07 PROCEDURE — 83880 ASSAY OF NATRIURETIC PEPTIDE: CPT

## 2023-07-07 PROCEDURE — 2709999900 HC NON-CHARGEABLE SUPPLY: Performed by: INTERNAL MEDICINE

## 2023-07-07 PROCEDURE — 93010 ELECTROCARDIOGRAM REPORT: CPT | Performed by: INTERNAL MEDICINE

## 2023-07-07 PROCEDURE — 83735 ASSAY OF MAGNESIUM: CPT

## 2023-07-07 PROCEDURE — 84484 ASSAY OF TROPONIN QUANT: CPT

## 2023-07-07 PROCEDURE — 2580000003 HC RX 258: Performed by: INTERNAL MEDICINE

## 2023-07-07 PROCEDURE — 93005 ELECTROCARDIOGRAM TRACING: CPT | Performed by: EMERGENCY MEDICINE

## 2023-07-07 PROCEDURE — 71045 X-RAY EXAM CHEST 1 VIEW: CPT

## 2023-07-07 PROCEDURE — 93005 ELECTROCARDIOGRAM TRACING: CPT | Performed by: INTERNAL MEDICINE

## 2023-07-07 PROCEDURE — 93458 L HRT ARTERY/VENTRICLE ANGIO: CPT | Performed by: INTERNAL MEDICINE

## 2023-07-07 PROCEDURE — 2580000003 HC RX 258: Performed by: NURSE PRACTITIONER

## 2023-07-07 PROCEDURE — 6370000000 HC RX 637 (ALT 250 FOR IP): Performed by: NURSE PRACTITIONER

## 2023-07-07 PROCEDURE — C9600 PERC DRUG-EL COR STENT SING: HCPCS | Performed by: INTERNAL MEDICINE

## 2023-07-07 PROCEDURE — C1887 CATHETER, GUIDING: HCPCS | Performed by: INTERNAL MEDICINE

## 2023-07-07 PROCEDURE — 6360000004 HC RX CONTRAST MEDICATION: Performed by: INTERNAL MEDICINE

## 2023-07-07 PROCEDURE — 6370000000 HC RX 637 (ALT 250 FOR IP): Performed by: INTERNAL MEDICINE

## 2023-07-07 PROCEDURE — C1874 STENT, COATED/COV W/DEL SYS: HCPCS | Performed by: INTERNAL MEDICINE

## 2023-07-07 PROCEDURE — C1894 INTRO/SHEATH, NON-LASER: HCPCS | Performed by: INTERNAL MEDICINE

## 2023-07-07 PROCEDURE — 92928 PRQ TCAT PLMT NTRAC ST 1 LES: CPT | Performed by: INTERNAL MEDICINE

## 2023-07-07 PROCEDURE — 99153 MOD SED SAME PHYS/QHP EA: CPT | Performed by: INTERNAL MEDICINE

## 2023-07-07 PROCEDURE — 82962 GLUCOSE BLOOD TEST: CPT

## 2023-07-07 PROCEDURE — 6360000002 HC RX W HCPCS: Performed by: INTERNAL MEDICINE

## 2023-07-07 PROCEDURE — C1725 CATH, TRANSLUMIN NON-LASER: HCPCS | Performed by: INTERNAL MEDICINE

## 2023-07-07 PROCEDURE — 99285 EMERGENCY DEPT VISIT HI MDM: CPT

## 2023-07-07 DEVICE — STENT ONYXNG30018UX ONYX 3.00X18RX
Type: IMPLANTABLE DEVICE | Status: FUNCTIONAL
Brand: ONYX FRONTIER™

## 2023-07-07 DEVICE — STENT ONYXNG30038UX ONYX 3.00X38RX
Type: IMPLANTABLE DEVICE | Status: FUNCTIONAL
Brand: ONYX FRONTIER™

## 2023-07-07 RX ORDER — POLYETHYLENE GLYCOL 3350 17 G/17G
17 POWDER, FOR SOLUTION ORAL DAILY PRN
Status: DISCONTINUED | OUTPATIENT
Start: 2023-07-07 | End: 2023-07-08 | Stop reason: HOSPADM

## 2023-07-07 RX ORDER — ACETAMINOPHEN 325 MG/1
650 TABLET ORAL EVERY 6 HOURS PRN
Status: DISCONTINUED | OUTPATIENT
Start: 2023-07-07 | End: 2023-07-08 | Stop reason: HOSPADM

## 2023-07-07 RX ORDER — METOPROLOL TARTRATE 5 MG/5ML
INJECTION INTRAVENOUS PRN
Status: DISCONTINUED | OUTPATIENT
Start: 2023-07-07 | End: 2023-07-07 | Stop reason: HOSPADM

## 2023-07-07 RX ORDER — METOPROLOL SUCCINATE 25 MG/1
50 TABLET, EXTENDED RELEASE ORAL DAILY
Status: DISCONTINUED | OUTPATIENT
Start: 2023-07-08 | End: 2023-07-08

## 2023-07-07 RX ORDER — LIDOCAINE HYDROCHLORIDE 10 MG/ML
INJECTION, SOLUTION INFILTRATION; PERINEURAL PRN
Status: DISCONTINUED | OUTPATIENT
Start: 2023-07-07 | End: 2023-07-07 | Stop reason: HOSPADM

## 2023-07-07 RX ORDER — PRAVASTATIN SODIUM 20 MG
20 TABLET ORAL NIGHTLY
Status: DISCONTINUED | OUTPATIENT
Start: 2023-07-07 | End: 2023-07-08 | Stop reason: HOSPADM

## 2023-07-07 RX ORDER — LISINOPRIL 20 MG/1
40 TABLET ORAL EVERY EVENING
Status: DISCONTINUED | OUTPATIENT
Start: 2023-07-07 | End: 2023-07-08 | Stop reason: HOSPADM

## 2023-07-07 RX ORDER — EPTIFIBATIDE 0.75 MG/ML
2 INJECTION, SOLUTION INTRAVENOUS CONTINUOUS
Status: DISPENSED | OUTPATIENT
Start: 2023-07-07 | End: 2023-07-08

## 2023-07-07 RX ORDER — PANTOPRAZOLE SODIUM 40 MG/1
40 TABLET, DELAYED RELEASE ORAL
Status: DISCONTINUED | OUTPATIENT
Start: 2023-07-08 | End: 2023-07-08 | Stop reason: HOSPADM

## 2023-07-07 RX ORDER — SODIUM CHLORIDE 0.9 % (FLUSH) 0.9 %
5-40 SYRINGE (ML) INJECTION EVERY 12 HOURS SCHEDULED
Status: DISCONTINUED | OUTPATIENT
Start: 2023-07-07 | End: 2023-07-08 | Stop reason: HOSPADM

## 2023-07-07 RX ORDER — SODIUM CHLORIDE 0.9 % (FLUSH) 0.9 %
5-40 SYRINGE (ML) INJECTION PRN
Status: DISCONTINUED | OUTPATIENT
Start: 2023-07-07 | End: 2023-07-08 | Stop reason: HOSPADM

## 2023-07-07 RX ORDER — AMLODIPINE BESYLATE 10 MG/1
10 TABLET ORAL DAILY
Status: DISCONTINUED | OUTPATIENT
Start: 2023-07-08 | End: 2023-07-08

## 2023-07-07 RX ORDER — EPTIFIBATIDE 2 MG/ML
INJECTION, SOLUTION INTRAVENOUS PRN
Status: DISCONTINUED | OUTPATIENT
Start: 2023-07-07 | End: 2023-07-07 | Stop reason: HOSPADM

## 2023-07-07 RX ORDER — ACETAMINOPHEN 325 MG/1
650 TABLET ORAL EVERY 4 HOURS PRN
Status: DISCONTINUED | OUTPATIENT
Start: 2023-07-07 | End: 2023-07-08 | Stop reason: HOSPADM

## 2023-07-07 RX ORDER — SODIUM CHLORIDE 9 MG/ML
INJECTION, SOLUTION INTRAVENOUS PRN
Status: DISCONTINUED | OUTPATIENT
Start: 2023-07-07 | End: 2023-07-08 | Stop reason: HOSPADM

## 2023-07-07 RX ORDER — ASPIRIN 81 MG/1
81 TABLET, CHEWABLE ORAL DAILY
Status: DISCONTINUED | OUTPATIENT
Start: 2023-07-08 | End: 2023-07-08 | Stop reason: HOSPADM

## 2023-07-07 RX ORDER — DONEPEZIL HYDROCHLORIDE 5 MG/1
5 TABLET, FILM COATED ORAL NIGHTLY
Status: DISCONTINUED | OUTPATIENT
Start: 2023-07-07 | End: 2023-07-08 | Stop reason: HOSPADM

## 2023-07-07 RX ORDER — MAGNESIUM SULFATE IN WATER 40 MG/ML
INJECTION, SOLUTION INTRAVENOUS CONTINUOUS PRN
Status: COMPLETED | OUTPATIENT
Start: 2023-07-07 | End: 2023-07-07

## 2023-07-07 RX ORDER — TRAZODONE HYDROCHLORIDE 50 MG/1
100 TABLET ORAL NIGHTLY
Status: DISCONTINUED | OUTPATIENT
Start: 2023-07-07 | End: 2023-07-08 | Stop reason: HOSPADM

## 2023-07-07 RX ORDER — ONDANSETRON 4 MG/1
4 TABLET, ORALLY DISINTEGRATING ORAL EVERY 8 HOURS PRN
Status: DISCONTINUED | OUTPATIENT
Start: 2023-07-07 | End: 2023-07-08 | Stop reason: HOSPADM

## 2023-07-07 RX ORDER — MAGNESIUM SULFATE IN WATER 40 MG/ML
2000 INJECTION, SOLUTION INTRAVENOUS
Status: DISCONTINUED | OUTPATIENT
Start: 2023-07-07 | End: 2023-07-07

## 2023-07-07 RX ORDER — ACETAMINOPHEN 325 MG/1
650 TABLET ORAL
Status: COMPLETED | OUTPATIENT
Start: 2023-07-07 | End: 2023-07-07

## 2023-07-07 RX ORDER — MIDAZOLAM HYDROCHLORIDE 1 MG/ML
INJECTION INTRAMUSCULAR; INTRAVENOUS PRN
Status: DISCONTINUED | OUTPATIENT
Start: 2023-07-07 | End: 2023-07-07 | Stop reason: HOSPADM

## 2023-07-07 RX ORDER — HEPARIN SODIUM 200 [USP'U]/100ML
INJECTION, SOLUTION INTRAVENOUS CONTINUOUS PRN
Status: DISCONTINUED | OUTPATIENT
Start: 2023-07-07 | End: 2023-07-07 | Stop reason: HOSPADM

## 2023-07-07 RX ORDER — HEPARIN SODIUM 10000 [USP'U]/ML
INJECTION, SOLUTION INTRAVENOUS; SUBCUTANEOUS PRN
Status: DISCONTINUED | OUTPATIENT
Start: 2023-07-07 | End: 2023-07-07 | Stop reason: HOSPADM

## 2023-07-07 RX ORDER — ASPIRIN 81 MG/1
81 TABLET, CHEWABLE ORAL
Status: COMPLETED | OUTPATIENT
Start: 2023-07-07 | End: 2023-07-07

## 2023-07-07 RX ORDER — ONDANSETRON 2 MG/ML
4 INJECTION INTRAMUSCULAR; INTRAVENOUS EVERY 6 HOURS PRN
Status: DISCONTINUED | OUTPATIENT
Start: 2023-07-07 | End: 2023-07-08 | Stop reason: HOSPADM

## 2023-07-07 RX ORDER — EPTIFIBATIDE 0.75 MG/ML
INJECTION, SOLUTION INTRAVENOUS CONTINUOUS PRN
Status: COMPLETED | OUTPATIENT
Start: 2023-07-07 | End: 2023-07-07

## 2023-07-07 RX ADMIN — ACETAMINOPHEN 650 MG: 325 TABLET ORAL at 13:18

## 2023-07-07 RX ADMIN — TRAZODONE HYDROCHLORIDE 100 MG: 50 TABLET ORAL at 23:30

## 2023-07-07 RX ADMIN — SODIUM CHLORIDE, PRESERVATIVE FREE 10 ML: 5 INJECTION INTRAVENOUS at 21:02

## 2023-07-07 RX ADMIN — ASPIRIN 81 MG 81 MG: 81 TABLET ORAL at 14:57

## 2023-07-07 RX ADMIN — EPTIFIBATIDE 2 MCG/KG/MIN: 0.75 INJECTION INTRAVENOUS at 20:30

## 2023-07-07 RX ADMIN — PRAVASTATIN SODIUM 20 MG: 20 TABLET ORAL at 21:03

## 2023-07-07 ASSESSMENT — PAIN SCALES - GENERAL
PAINLEVEL_OUTOF10: 0
PAINLEVEL_OUTOF10: 8

## 2023-07-07 ASSESSMENT — ENCOUNTER SYMPTOMS
RHINORRHEA: 0
VOMITING: 0
COLOR CHANGE: 0
GASTROINTESTINAL NEGATIVE: 1
CHEST TIGHTNESS: 1
COUGH: 0
SHORTNESS OF BREATH: 1
ABDOMINAL PAIN: 0
ALLERGIC/IMMUNOLOGIC NEGATIVE: 1
EYES NEGATIVE: 1
DIARRHEA: 0
BACK PAIN: 0
NAUSEA: 1

## 2023-07-07 ASSESSMENT — LIFESTYLE VARIABLES
HOW MANY STANDARD DRINKS CONTAINING ALCOHOL DO YOU HAVE ON A TYPICAL DAY: 5 OR 6
HOW OFTEN DO YOU HAVE A DRINK CONTAINING ALCOHOL: 4 OR MORE TIMES A WEEK

## 2023-07-07 ASSESSMENT — PAIN DESCRIPTION - LOCATION: LOCATION: EYE;HEAD

## 2023-07-07 NOTE — PROGRESS NOTES
Integrilin gtt ordered to end 12hours from start time in cath lab and brilinta 90mg ordered BID per Estrella Pino NP.

## 2023-07-07 NOTE — PROGRESS NOTES
TRANSFER - OUT REPORT:    Verbal report given to Mckayla Hernandez on [place]  being transferred to 3rd floor for routine post-op       Report consisted of patient's Situation, Background, Assessment and   Recommendations(SBAR). Information from the following report(s) Nurse Handoff Report was reviewed with the receiving nurse. [place] Fall Assessment:    Presents to emergency department  because of falls (Syncope, seizure, or loss of consciousness): No  Age > 70: No  Altered Mental Status, Intoxication with alcohol or substance confusion (Disorientation, impaired judgment, poor safety awaremess, or inability to follow instructions): No  Impaired Mobility: Ambulates or transfers with assistive devices or assistance; Unable to ambulate or transer.: No             Lines:   Peripheral IV 07/07/23 Left Antecubital (Active)   Site Assessment Clean, dry & intact 07/07/23 1517   Line Status Blood return noted 07/07/23 1517   Phlebitis Assessment No symptoms 07/07/23 1517   Infiltration Assessment 0 07/07/23 1517   Dressing Status New dressing applied;Clean, dry & intact 07/07/23 1517   Dressing Type Transparent 07/07/23 1517   Dressing Intervention New 07/07/23 1517       Peripheral IV 07/07/23 Right Arm (Active)        Opportunity for questions and clarification was provided.       Patient transported with:  Monitor, Patient-specific medications from Pharmacy, and Registered Nurse

## 2023-07-07 NOTE — ED NOTES
TRANSFER - OUT REPORT:    Verbal report given to Bisi Garcia on Mahin Monroe  being transferred to [phone] for routine progression of patient care       Report consisted of patient's Situation, Background, Assessment and   Recommendations(SBAR). Information from the following report(s) ED SBAR was reviewed with the receiving nurse. [place] Fall Assessment:    Presents to emergency department  because of falls (Syncope, seizure, or loss of consciousness): No  Age > 70: No  Altered Mental Status, Intoxication with alcohol or substance confusion (Disorientation, impaired judgment, poor safety awaremess, or inability to follow instructions): No  Impaired Mobility: Ambulates or transfers with assistive devices or assistance; Unable to ambulate or transer.: No             Lines:   Peripheral IV 07/07/23 Left Antecubital (Active)   Site Assessment Clean, dry & intact 07/07/23 1517   Line Status Blood return noted 07/07/23 1517   Phlebitis Assessment No symptoms 07/07/23 1517   Infiltration Assessment 0 07/07/23 1517   Dressing Status New dressing applied;Clean, dry & intact 07/07/23 1517   Dressing Type Transparent 07/07/23 1517   Dressing Intervention New 07/07/23 1517        Opportunity for questions and clarification was provided.       Patient transported with:  Registered Nurse           Joycelyn Bee RN  07/07/23 0101

## 2023-07-07 NOTE — ED TRIAGE NOTES
Pt arrives with c/o shortness of breath, lightheadedness, and nausea for the past two weeks that has worsened over the last several days. States his at home bp monitor was showing invalid reading and HR of 44 and his provider told him to come to ER. Denies chest pain or discomfort.

## 2023-07-07 NOTE — ED NOTES
Pt cardiac monitor reading HR at 82 showing PVCs, manual radial pulse palpated at 36.       Reina Hernandez RN  07/07/23 2566

## 2023-07-07 NOTE — ED PROVIDER NOTES
Emergency Department Provider Note       PCP: Paige Chin MD   Age: 77 y.o. Sex: male     DISPOSITION       No diagnosis found. Medical Decision Making     Complexity of Problems Addressed:  1 or more acute illnesses that pose a threat to life or bodily function. Data Reviewed and Analyzed:  Category 1:   I independently ordered and reviewed each unique test.  I reviewed external records: provider visit note from outside specialist.       Category 2:   I independently ordered and interpreted the ED EKG in the absence of a Cardiologist.    Rate: 103  EKG Interpretation: Sinus bradycardia Colt Cluster with multiple PVCs and runs of nonsustained V. tach  ST Segments: Nonspecific ST segments - NO STEMI  I interpreted the X-rays NAD. Category 3: Discussion of management or test interpretation. During the [address] course of the patient's ER stay his ectopy calmed down significantly. Now only occasional PVCs. Kidney function and electrolytes and troponin normal.  Chest x-ray unremarkable. Cardiology consulted for evaluation and management given occasional runs of nonsustained V. tach of about 3 beats. The management of this patient was discussed with an external consultant. Risk of Complications and/or Morbidity of Patient Management:  Discussion with external consultants. Is this patient to be included in the SEP-1 core measure due to severe sepsis or septic shock? No Exclusion criteria - the patient is NOT to be included for SEP-1 Core Measure due to: Infection is not suspected      History      Shanell Mckenzie is a 77 y.o. male who presents to the Emergency Department with chief complaint of    Chief Complaint   Patient presents with    Shortness of Breath    Bradycardia             77year-old male complains of worsening dyspnea on exertion, lightheadedness, nausea and generalized weakness over the last 1 to 2 weeks. Significantly worse over the last couple of days.   Home blood pressure

## 2023-07-07 NOTE — PROGRESS NOTES
TRANSFER - OUT REPORT:    Verbal report given to RN on Alba Dean  being transferred to [facility] for routine progression of patient care       Report consisted of patient's Situation, Background, Assessment and   Recommendations(SBAR). Information from the following report(s) Nurse Handoff Report and MAR was reviewed with the receiving nurse.       PCI w/ Dr. Alexandria Key   Stent to RCA x2  R radial access  TR band to R radial @ 12mL  No s/sxs of bleeding or hematoma to R radial access site    Magnesium 2g IV  Lopressor 15mg IV, in 3 doses     Heparin 9,000 units   Versed 3mg IV  Fentanyl 75mcg IV  Integrilin bolus and gtt, gtt started at 1552, gtt  to run for 12 hours  Brilinta 180mg po

## 2023-07-08 VITALS
BODY MASS INDEX: 26.61 KG/M2 | HEART RATE: 87 BPM | DIASTOLIC BLOOD PRESSURE: 67 MMHG | SYSTOLIC BLOOD PRESSURE: 129 MMHG | TEMPERATURE: 98.1 F | HEIGHT: 72 IN | WEIGHT: 196.5 LBS | RESPIRATION RATE: 16 BRPM | OXYGEN SATURATION: 97 %

## 2023-07-08 PROBLEM — I47.1 ATRIAL TACHYCARDIA (HCC): Status: ACTIVE | Noted: 2023-07-08

## 2023-07-08 PROBLEM — I47.19 ATRIAL TACHYCARDIA: Status: ACTIVE | Noted: 2023-07-08

## 2023-07-08 PROBLEM — I47.29 NSVT (NONSUSTAINED VENTRICULAR TACHYCARDIA) (HCC): Status: RESOLVED | Noted: 2023-07-07 | Resolved: 2023-07-08

## 2023-07-08 LAB
ANION GAP SERPL CALC-SCNC: 7 MMOL/L (ref 2–11)
BUN SERPL-MCNC: 11 MG/DL (ref 8–23)
CALCIUM SERPL-MCNC: 9.4 MG/DL (ref 8.3–10.4)
CHLORIDE SERPL-SCNC: 109 MMOL/L (ref 101–110)
CHOLEST SERPL-MCNC: 127 MG/DL
CO2 SERPL-SCNC: 23 MMOL/L (ref 21–32)
CREAT SERPL-MCNC: 1.1 MG/DL (ref 0.8–1.5)
EKG ATRIAL RATE: 72 BPM
EKG DIAGNOSIS: NORMAL
EKG P AXIS: 48 DEGREES
EKG P-R INTERVAL: 174 MS
EKG Q-T INTERVAL: 418 MS
EKG QRS DURATION: 160 MS
EKG QTC CALCULATION (BAZETT): 457 MS
EKG R AXIS: -67 DEGREES
EKG T AXIS: 34 DEGREES
EKG VENTRICULAR RATE: 72 BPM
ERYTHROCYTE [DISTWIDTH] IN BLOOD BY AUTOMATED COUNT: 13.4 % (ref 11.9–14.6)
GLUCOSE SERPL-MCNC: 99 MG/DL (ref 65–100)
HCT VFR BLD AUTO: 46.2 % (ref 41.1–50.3)
HDLC SERPL-MCNC: 85 MG/DL (ref 40–60)
HDLC SERPL: 1.5
HGB BLD-MCNC: 15.6 G/DL (ref 13.6–17.2)
LDLC SERPL CALC-MCNC: 29.2 MG/DL
MAGNESIUM SERPL-MCNC: 2.5 MG/DL (ref 1.8–2.4)
MCH RBC QN AUTO: 32.3 PG (ref 26.1–32.9)
MCHC RBC AUTO-ENTMCNC: 33.8 G/DL (ref 31.4–35)
MCV RBC AUTO: 95.7 FL (ref 82–102)
NRBC # BLD: 0 K/UL (ref 0–0.2)
PLATELET # BLD AUTO: 60 K/UL (ref 150–450)
PMV BLD AUTO: 12.1 FL (ref 9.4–12.3)
POTASSIUM SERPL-SCNC: 3.8 MMOL/L (ref 3.5–5.1)
RBC # BLD AUTO: 4.83 M/UL (ref 4.23–5.6)
SODIUM SERPL-SCNC: 139 MMOL/L (ref 133–143)
TRIGL SERPL-MCNC: 64 MG/DL (ref 35–150)
VLDLC SERPL CALC-MCNC: 12.8 MG/DL (ref 6–23)
WBC # BLD AUTO: 4.6 K/UL (ref 4.3–11.1)

## 2023-07-08 PROCEDURE — 36415 COLL VENOUS BLD VENIPUNCTURE: CPT

## 2023-07-08 PROCEDURE — 2580000003 HC RX 258: Performed by: INTERNAL MEDICINE

## 2023-07-08 PROCEDURE — 80048 BASIC METABOLIC PNL TOTAL CA: CPT

## 2023-07-08 PROCEDURE — 99238 HOSP IP/OBS DSCHRG MGMT 30/<: CPT | Performed by: INTERNAL MEDICINE

## 2023-07-08 PROCEDURE — G0378 HOSPITAL OBSERVATION PER HR: HCPCS

## 2023-07-08 PROCEDURE — 80061 LIPID PANEL: CPT

## 2023-07-08 PROCEDURE — 85027 COMPLETE CBC AUTOMATED: CPT

## 2023-07-08 PROCEDURE — 6370000000 HC RX 637 (ALT 250 FOR IP): Performed by: INTERNAL MEDICINE

## 2023-07-08 PROCEDURE — 83735 ASSAY OF MAGNESIUM: CPT

## 2023-07-08 PROCEDURE — 6370000000 HC RX 637 (ALT 250 FOR IP): Performed by: NURSE PRACTITIONER

## 2023-07-08 PROCEDURE — 2580000003 HC RX 258: Performed by: NURSE PRACTITIONER

## 2023-07-08 PROCEDURE — 93010 ELECTROCARDIOGRAM REPORT: CPT | Performed by: INTERNAL MEDICINE

## 2023-07-08 RX ORDER — NITROGLYCERIN 0.4 MG/1
0.4 TABLET SUBLINGUAL EVERY 5 MIN PRN
Qty: 25 TABLET | Refills: 2 | Status: SHIPPED | OUTPATIENT
Start: 2023-07-08

## 2023-07-08 RX ORDER — POTASSIUM CHLORIDE 20 MEQ/1
40 TABLET, EXTENDED RELEASE ORAL ONCE
Status: COMPLETED | OUTPATIENT
Start: 2023-07-08 | End: 2023-07-08

## 2023-07-08 RX ORDER — AMLODIPINE BESYLATE 5 MG/1
5 TABLET ORAL DAILY
Status: DISCONTINUED | OUTPATIENT
Start: 2023-07-08 | End: 2023-07-08 | Stop reason: HOSPADM

## 2023-07-08 RX ORDER — ATORVASTATIN CALCIUM 40 MG/1
40 TABLET, FILM COATED ORAL DAILY
Qty: 30 TABLET | Refills: 11 | Status: SHIPPED | OUTPATIENT
Start: 2023-07-08

## 2023-07-08 RX ORDER — METOPROLOL SUCCINATE 25 MG/1
50 TABLET, EXTENDED RELEASE ORAL 2 TIMES DAILY
Status: DISCONTINUED | OUTPATIENT
Start: 2023-07-08 | End: 2023-07-08 | Stop reason: HOSPADM

## 2023-07-08 RX ORDER — METOPROLOL SUCCINATE 50 MG/1
50 TABLET, EXTENDED RELEASE ORAL 2 TIMES DAILY
Qty: 30 TABLET | Refills: 5 | Status: SHIPPED | OUTPATIENT
Start: 2023-07-08

## 2023-07-08 RX ADMIN — SODIUM CHLORIDE, PRESERVATIVE FREE 10 ML: 5 INJECTION INTRAVENOUS at 09:09

## 2023-07-08 RX ADMIN — AMLODIPINE BESYLATE 5 MG: 5 TABLET ORAL at 09:03

## 2023-07-08 RX ADMIN — TICAGRELOR 90 MG: 90 TABLET ORAL at 04:35

## 2023-07-08 RX ADMIN — PANTOPRAZOLE SODIUM 40 MG: 40 TABLET, DELAYED RELEASE ORAL at 06:42

## 2023-07-08 RX ADMIN — POTASSIUM CHLORIDE 40 MEQ: 1500 TABLET, EXTENDED RELEASE ORAL at 09:04

## 2023-07-08 RX ADMIN — ASPIRIN 81 MG 81 MG: 81 TABLET ORAL at 09:03

## 2023-07-08 RX ADMIN — METOPROLOL SUCCINATE 50 MG: 25 TABLET, EXTENDED RELEASE ORAL at 09:03

## 2023-07-08 ASSESSMENT — PAIN SCALES - GENERAL: PAINLEVEL_OUTOF10: 0

## 2023-07-08 NOTE — DISCHARGE SUMMARY
showed:   -- Successful angioplasty and stenting of the mid RCA  -- Mildly reduced LV systolic function  -- TR band stasis      The following morning patient was up feeling well without any complaints of chest pain or shortness of breath. Patient's RRA cath site was clean, dry and intact without hematoma or bruit. Patient's labs were stable. Patient was seen and examined by Dr. Yakov Pérez and determined stable and ready for discharge. Patient was instructed on the importance of medication compliance including taking Aspirin and Brilinta everyday without missing a dose. After receiving drug eluting stents, the patient will remain on dual anti-platelet therapy for at least 1 year. For maximized medical therapy for CAD, patient will continue BB (increased dose), ACE-I, and high intensity statin statin. The patient's Norvasc was stopped. The patient will follow up with [facility] (office will call patient with appt time) and has been referred to cardiac rehab. [facility] office has been informed you need a follow up appointment after discharge. You will be called on Monday with the follow up appointment. If you have NOT heard from our office by Tuesday, please contact our office for appointment at [phone]. DISPOSITION: The patient is being discharged home in stable condition on a low saturated fat, low cholesterol and low salt diet. The patient is instructed to advance activities as tolerated to the limit of fatigue or shortness of breath. The patient is instructed to avoid all heavy lifting, straining, stooping or squatting for 3-5 days. The patient is instructed to watch the cath site for bleeding/oozing; if seen, the patient is instructed to apply firm pressure with a clean cloth and call [facility] at [phone]. The patient is instructed to watch for signs of infection which include: increasing area of redness, fever/hot to touch or purulent drainage at the catheterization site.  The

## 2023-07-08 NOTE — CARE COORDINATION
Pt chart reviewed for discharge planning. Pt is for discharge home today with family and no needs/supportive care orders recieved for CM at this time. Pt is to follow up with Armida Slater.       07/08/23 0880   Service Assessment   Patient Orientation Alert and Oriented   Cognition Alert   History Provided By Medical Record   Primary Caregiver Self   Support Systems Spouse/Significant Other   Patient's Healthcare Decision Maker is: Legal Next of Kin   PCP Verified by CM Yes   Last Visit to PCP Within last 3 months   Prior Functional Level Independent in ADLs/IADLs   Current Functional Level Independent in ADLs/IADLs   Can patient return to prior living arrangement Yes   Ability to make needs known: Good   Family able to assist with home care needs: Yes   Would you like for me to discuss the discharge plan with any other family members/significant others, and if so, who? No   Financial Resources Medicare  ([employer])   Community Resources None   Social/Functional History   Lives With Spouse   Type of Home House   Discharge Planning   Type of Residence House   Living Arrangements Spouse/Significant Other   Patient expects to be discharged to: [facility] Discharge   Transition of Care Consult (CM Consult) Discharge [facility] Discharge Outpatient  (referral to [address] [place] [address])   [address] Provided? No   Mode of Transport at Discharge Other (see comment)  (family)   Confirm Follow Up Transport Family   Condition of Participation: Discharge Planning   The Plan for Transition of Care is related to the following treatment goals: Pt will return home with family at discharge. The Patient and/Or Patient Representative agree with the Discharge Plan?  Yes

## 2023-07-21 ENCOUNTER — TELEPHONE (OUTPATIENT)
Age: 68
End: 2023-07-21

## 2023-07-21 NOTE — TELEPHONE ENCOUNTER
Right radial cath by Dr. Veronica Walker on 7/7/23.   3-4 days ago, pushed knife with right thumb carving wood for 4 hours. When pushing up from sitting down with right arm, yesterday, felt sudden very bad sharp pain in right arm, 3-4 inches above right radial cath site. Continues to feel very bad sharp pain 3-4 inches above right radial cath site when he bends hand back and when pushing up with right arm. No redness, discoloration, swelling, heat, or purulent drainage at right radial cath site or on right arm. Right arm does not hurt when he makes fist or squeezes object with right hand. Wearing weight lifting band on right arm helps to decrease pain. Patient asks for recommendations for right arm pain and asks if any need  for concern.

## 2023-07-21 NOTE — TELEPHONE ENCOUNTER
Tylenol and ice packs with limitation to extended range of motion over the weekend and it should feel better by Monday if not present here for a nurses visit

## 2023-07-21 NOTE — TELEPHONE ENCOUNTER
Patient said Dr. Larsen Precise done [facility] cath and stents on 07/07/2023 and his right arm healed up very well. Patient said now when he uses his arms to rise from a chair his right arm about 4 inches from incision site hurts. Patient said he did do some whittling on day and could this aggravate it?

## 2023-08-06 ENCOUNTER — HOSPITAL ENCOUNTER (EMERGENCY)
Age: 68
Discharge: HOME | End: 2023-08-06
Attending: EMERGENCY MEDICINE
Payer: MEDICARE

## 2023-08-06 ENCOUNTER — APPOINTMENT (OUTPATIENT)
Dept: GENERAL RADIOLOGY | Age: 68
End: 2023-08-06
Payer: MEDICARE

## 2023-08-06 VITALS
SYSTOLIC BLOOD PRESSURE: 148 MMHG | HEIGHT: 72 IN | OXYGEN SATURATION: 96 % | BODY MASS INDEX: 27.09 KG/M2 | TEMPERATURE: 98 F | WEIGHT: 200 LBS | HEART RATE: 57 BPM | DIASTOLIC BLOOD PRESSURE: 79 MMHG | RESPIRATION RATE: 12 BRPM

## 2023-08-06 DIAGNOSIS — R07.9 CHEST PAIN, UNSPECIFIED TYPE: Primary | ICD-10-CM

## 2023-08-06 DIAGNOSIS — I10 PRIMARY HYPERTENSION: ICD-10-CM

## 2023-08-06 PROBLEM — R07.89 ATYPICAL CHEST PAIN: Status: ACTIVE | Noted: 2023-08-06

## 2023-08-06 PROBLEM — I25.10 CAD (CORONARY ARTERY DISEASE): Status: ACTIVE | Noted: 2023-08-06

## 2023-08-06 LAB
ANION GAP SERPL CALC-SCNC: 2 MMOL/L (ref 2–11)
BASOPHILS # BLD: 0 K/UL (ref 0–0.2)
BASOPHILS NFR BLD: 1 % (ref 0–2)
BUN SERPL-MCNC: 15 MG/DL (ref 8–23)
CALCIUM SERPL-MCNC: 9.5 MG/DL (ref 8.3–10.4)
CHLORIDE SERPL-SCNC: 109 MMOL/L (ref 101–110)
CO2 SERPL-SCNC: 27 MMOL/L (ref 21–32)
CREAT SERPL-MCNC: 1.4 MG/DL (ref 0.8–1.5)
DIFFERENTIAL METHOD BLD: NORMAL
EKG ATRIAL RATE: 64 BPM
EKG DIAGNOSIS: NORMAL
EKG P AXIS: 40 DEGREES
EKG P-R INTERVAL: 162 MS
EKG Q-T INTERVAL: 430 MS
EKG QRS DURATION: 152 MS
EKG QTC CALCULATION (BAZETT): 443 MS
EKG R AXIS: -61 DEGREES
EKG T AXIS: 32 DEGREES
EKG VENTRICULAR RATE: 64 BPM
EOSINOPHIL # BLD: 0.1 K/UL (ref 0–0.8)
EOSINOPHIL NFR BLD: 2 % (ref 0.5–7.8)
ERYTHROCYTE [DISTWIDTH] IN BLOOD BY AUTOMATED COUNT: 13.6 % (ref 11.9–14.6)
GLUCOSE SERPL-MCNC: 138 MG/DL (ref 65–100)
HCT VFR BLD AUTO: 48.7 % (ref 41.1–50.3)
HGB BLD-MCNC: 15.8 G/DL (ref 13.6–17.2)
IMM GRANULOCYTES # BLD AUTO: 0 K/UL (ref 0–0.5)
IMM GRANULOCYTES NFR BLD AUTO: 0 % (ref 0–5)
LYMPHOCYTES # BLD: 1 K/UL (ref 0.5–4.6)
LYMPHOCYTES NFR BLD: 19 % (ref 13–44)
MAGNESIUM SERPL-MCNC: 2.2 MG/DL (ref 1.8–2.4)
MCH RBC QN AUTO: 32.1 PG (ref 26.1–32.9)
MCHC RBC AUTO-ENTMCNC: 32.4 G/DL (ref 31.4–35)
MCV RBC AUTO: 99 FL (ref 82–102)
MONOCYTES # BLD: 0.4 K/UL (ref 0.1–1.3)
MONOCYTES NFR BLD: 9 % (ref 4–12)
NEUTS SEG # BLD: 3.6 K/UL (ref 1.7–8.2)
NEUTS SEG NFR BLD: 69 % (ref 43–78)
NRBC # BLD: 0 K/UL (ref 0–0.2)
PLATELET # BLD AUTO: 176 K/UL (ref 150–450)
PMV BLD AUTO: 10.3 FL (ref 9.4–12.3)
POTASSIUM SERPL-SCNC: 4.1 MMOL/L (ref 3.5–5.1)
RBC # BLD AUTO: 4.92 M/UL (ref 4.23–5.6)
SODIUM SERPL-SCNC: 138 MMOL/L (ref 133–143)
TROPONIN I SERPL HS-MCNC: 15.3 PG/ML (ref 0–14)
TROPONIN I SERPL HS-MCNC: 15.6 PG/ML (ref 0–14)
WBC # BLD AUTO: 5.2 K/UL (ref 4.3–11.1)

## 2023-08-06 PROCEDURE — 93010 ELECTROCARDIOGRAM REPORT: CPT | Performed by: INTERNAL MEDICINE

## 2023-08-06 PROCEDURE — 83735 ASSAY OF MAGNESIUM: CPT

## 2023-08-06 PROCEDURE — 6370000000 HC RX 637 (ALT 250 FOR IP): Performed by: EMERGENCY MEDICINE

## 2023-08-06 PROCEDURE — 85025 COMPLETE CBC W/AUTO DIFF WBC: CPT

## 2023-08-06 PROCEDURE — 71045 X-RAY EXAM CHEST 1 VIEW: CPT

## 2023-08-06 PROCEDURE — 80048 BASIC METABOLIC PNL TOTAL CA: CPT

## 2023-08-06 PROCEDURE — 99285 EMERGENCY DEPT VISIT HI MDM: CPT

## 2023-08-06 PROCEDURE — 94760 N-INVAS EAR/PLS OXIMETRY 1: CPT

## 2023-08-06 PROCEDURE — 93005 ELECTROCARDIOGRAM TRACING: CPT | Performed by: EMERGENCY MEDICINE

## 2023-08-06 PROCEDURE — 99283 EMERGENCY DEPT VISIT LOW MDM: CPT | Performed by: INTERNAL MEDICINE

## 2023-08-06 PROCEDURE — 84484 ASSAY OF TROPONIN QUANT: CPT

## 2023-08-06 RX ORDER — NITROGLYCERIN 0.4 MG/1
0.4 TABLET SUBLINGUAL EVERY 5 MIN PRN
Status: DISCONTINUED | OUTPATIENT
Start: 2023-08-06 | End: 2023-08-06 | Stop reason: HOSPADM

## 2023-08-06 RX ORDER — LISINOPRIL 20 MG/1
20 TABLET ORAL 2 TIMES DAILY
Qty: 60 TABLET | Refills: 0 | Status: SHIPPED | OUTPATIENT
Start: 2023-08-06 | End: 2023-09-05

## 2023-08-06 RX ORDER — AMLODIPINE BESYLATE 10 MG/1
10 TABLET ORAL DAILY
Qty: 30 TABLET | Refills: 0 | Status: SHIPPED | OUTPATIENT
Start: 2023-08-06 | End: 2023-09-05

## 2023-08-06 RX ORDER — ASPIRIN 81 MG/1
162 TABLET, CHEWABLE ORAL ONCE
Status: COMPLETED | OUTPATIENT
Start: 2023-08-06 | End: 2023-08-06

## 2023-08-06 RX ADMIN — NITROGLYCERIN 0.4 MG: 0.4 TABLET SUBLINGUAL at 10:44

## 2023-08-06 RX ADMIN — ASPIRIN 81 MG 162 MG: 81 TABLET ORAL at 10:44

## 2023-08-06 ASSESSMENT — ENCOUNTER SYMPTOMS
GASTROINTESTINAL NEGATIVE: 1
SHORTNESS OF BREATH: 1
BACK PAIN: 0

## 2023-08-06 ASSESSMENT — PAIN SCALES - GENERAL: PAINLEVEL_OUTOF10: 6

## 2023-08-06 ASSESSMENT — PAIN - FUNCTIONAL ASSESSMENT: PAIN_FUNCTIONAL_ASSESSMENT: 0-10

## 2023-08-06 NOTE — DISCHARGE INSTRUCTIONS
Follow-up with your cardiologist on Wednesday as scheduled. Return sooner to the emergency room if worsening symptoms or any medical emergency.   New blood pressure medications regimen:  Amlodipine 10 mg daily  Metoprolol 50 mg XL twice a day  Lisinopril 20 mg every morning and night  I have given you prescription for the lisinopril 20 mg and amlodipine 10 mg for a total of 3 months

## 2023-08-06 NOTE — ED TRIAGE NOTES
Patient states new onset substernal chest pain 0600, 2 stents placed 3x weeks ago, checked home bp showing 180/90, took additional lisinopril 168/81 in triage

## 2023-08-06 NOTE — H&P
[address]                 Primary Cardiologist: Dr Denys Arevalo    Primary Care Physician: Jorge Handy MD    Consulting Physician: Dr Yadira Vasquez    Subjective:     Patient is a 77 y.o. male who presents with CP. He has a h/o  TERENCE with inspire device in place, COPD (mild), DSL, HTN, CAD w [facility] 7/2023 w PCI to RCA, 7/2023 echo w EF 50-55%, mild MR, mild KEITH. Presented to the ER as CP. Norvasc stopped after last admission. In ER BMP wnl except glucose 138, HS trop 15, EKG NSR w rate 64 w R BBB. /81.      Past cardiac eval:  [facility] 7/2023 w PCI to RCA  7/2023 echo w EF 50-55%, mild MR, mild KEITH      Past Medical History:   Diagnosis Date    Acute kidney failure, unspecified (HCC)     hx of r/t ibuprofen     MELANIE (acute kidney injury) ([address]) 3/12/2013    Arthritis of left shoulder region left    has bone spur     BBB (bundle branch block) 09/2017    Right    Cancer ([address])     Basal cell on back    COPD, mild ([address]) 11/28/2017    Depression     patient denies    Dyslipidemia     GERD (gastroesophageal reflux disease)     controlled with med    Hemangioma of skin     left leg/ upper thigh    History of esophageal dilatation     due to stricture    History of fracture of foot right    may 10th seen at  and 2month later reoccurred and has bad arthritis on MCP     HTN (hypertension)     Hypertonicity of bladder     states \"overactive bladder\"    Hypertrophy of prostate with urinary obstruction and other lower urinary tract symptoms (LUTS)     denies    Impotence of organic origin     Leg pain     left leg    Low serum testosterone level     TERENCE (obstructive sleep apnea) 11/28/2017    No CPAP    Osteoarthrosis, unspecified whether generalized or localized, unspecified site     back/ R great toe    Unspecified disorder of kidney and ureter 2008    states related to bp       Past Surgical History:   Procedure Laterality Date    CARDIAC PROCEDURE N/A 7/7/2023    Left heart cath / coronary angiography

## 2023-08-06 NOTE — ED NOTES
Patient reports of having chest pain this morning. Patient reports of feeling bad yesterday all day. Denies nausea and vomit. Patient is alert and oriented. Patient is reporting chest tightness. Rhoda 7th patient had a tent placed.       Clifton Lindsey RN  08/06/23 1042

## 2023-08-06 NOTE — ED PROVIDER NOTES
[facility] CATH [facility]    COLONOSCOPY      HEENT      sinus    ORTHOPEDIC SURGERY Right 2016    plate to right great toe    OTHER SURGICAL HISTORY Left 2016    hemangioma repair - thigh    SHOULDER ARTHROPLASTY Left 2015    SHOULDER ARTHROSCOPY Left     TUMOR REMOVAL Left 50 years ago    left thigh had debulking procedure x 2 for hemangioma in left leg    UPPER GASTROINTESTINAL ENDOSCOPY      esophageal dilation        Social History     Socioeconomic History    Marital status:    Tobacco Use    Smoking status: Former     Packs/day: 3.00     Years: 30.00     Pack years: 90.00     Types: Cigarettes     Quit date: 1994     Years since quittin.6    Smokeless tobacco: Never    Tobacco comments:     Quit smoking: Restarted somking after 10/16, and again 2017.quit age 42-started back X 1 yr   Substance and Sexual Activity    Alcohol use: Yes     Alcohol/week: 6.0 standard drinks    Drug use: Not Currently     Types: Marijuana Graciella Muzzy)        Previous Medications    ALBUTEROL SULFATE HFA (PROVENTIL;VENTOLIN;PROAIR) 108 (90 BASE) MCG/ACT INHALER    Inhale 2 puffs into the lungs every 6 hours as needed for Wheezing    ASPIRIN 81 MG CHEWABLE TABLET    Take 1 tablet by mouth daily    ATORVASTATIN (LIPITOR) 40 MG TABLET    Take 1 tablet by mouth daily    DONEPEZIL (ARICEPT) 5 MG TABLET        METOPROLOL SUCCINATE (TOPROL XL) 50 MG EXTENDED RELEASE TABLET    Take 1 tablet by mouth 2 times daily    MULTIPLE VITAMIN (MULTI-VITAMIN PO)    Take by mouth    NITROGLYCERIN (NITROSTAT) 0.4 MG SL TABLET    Place 1 tablet under the tongue every 5 minutes as needed for Chest pain up to max of 3 total doses. If no relief after 1 dose, call 911. OMEGA-3 FATTY ACIDS (OMEGA 3 PO)    Take 1 capsule by mouth    OMEPRAZOLE (PRILOSEC) 20 MG DELAYED RELEASE CAPSULE    Take 1 capsule by mouth daily    TESTOSTERONE CYPIONATE (DEPOTESTOTERONE CYPIONATE) 200 MG/ML INJECTION    Inject 1 mL into the muscle every 14 days.

## 2023-08-15 NOTE — PROGRESS NOTES
[facility] CARDIOLOGY  [address] [facility], [facility]  PHONE: [phone]      23    NAME:  Cullen Jaquez  : 1955  MRN: [id number]         SUBJECTIVE:   Cullen Jaquez is a 77 y.o. male seen for a follow up visit regarding the following:     Chief Complaint   Patient presents with    Chest Pain    New Patient            HPI:  Follow up  Chest Pain and New Patient   . New to me, was seen by colleagues in house with VT, CAD, 2 stents to RCA, low normal EF, went back to ER a month later (didn't follow up in office after discharge) with hypertension and cp, meds adjusted and cardiology follow up arranged. He stopped taking Brilinta on his own, didn't contact any of us, he didn't feel well on it d/t dyspnea. This was extremely dangerous. He never went to cardiac rehab, stating he never got a call, preferred [facility], but since he didn't follow up in office at the time, that never happened. Endorses 4-5 alcoholic beverages daily, does not see it as a problem, has no intention of quitting. Sedentary, citing diffuse arthritis pain, followed at pain clinic    Complains of a long history of constant dizziness, has it now, not associated with any change in heart rate, rhythm or bp. PCP once treated him as vertigo but didn't help and patient didn't follow up, urged to do so. PAST CARDIAC HISTORY:  2023       Symptomatic VT, LHC - 3.0 x 38 peggy and 3.0 x 18 peggy to p-mRCA      Echo - EF 50-55%, normal DF, reduced RV function (??)          Key CAD CHF Meds            lisinopril (PRINIVIL;ZESTRIL) 20 MG tablet (Taking)    Sig - Route: Take 1 tablet by mouth in the morning and at bedtime - Oral    amLODIPine (NORVASC) 10 MG tablet (Taking)    Sig - Route: Take 1 tablet by mouth daily - Oral    metoprolol succinate (TOPROL XL) 50 MG extended release tablet (Taking)    Sig - Route:  Take 1 tablet by mouth 2 times daily - Oral    atorvastatin (LIPITOR) 40 MG

## 2023-08-16 ENCOUNTER — OFFICE VISIT (OUTPATIENT)
Age: 68
End: 2023-08-16
Payer: MEDICARE

## 2023-08-16 VITALS
SYSTOLIC BLOOD PRESSURE: 124 MMHG | HEIGHT: 72 IN | WEIGHT: 204 LBS | DIASTOLIC BLOOD PRESSURE: 70 MMHG | BODY MASS INDEX: 27.63 KG/M2 | HEART RATE: 74 BPM

## 2023-08-16 DIAGNOSIS — I10 ESSENTIAL HYPERTENSION: ICD-10-CM

## 2023-08-16 DIAGNOSIS — F10.90 HEAVY ALCOHOL USE: ICD-10-CM

## 2023-08-16 DIAGNOSIS — Z91.199 H/O NONCOMPLIANCE WITH MEDICAL TREATMENT, PRESENTING HAZARDS TO HEALTH: ICD-10-CM

## 2023-08-16 DIAGNOSIS — I25.118 CORONARY ARTERY DISEASE OF NATIVE ARTERY OF NATIVE HEART WITH STABLE ANGINA PECTORIS (HCC): Primary | ICD-10-CM

## 2023-08-16 DIAGNOSIS — Z95.5 S/P DRUG ELUTING CORONARY STENT PLACEMENT: ICD-10-CM

## 2023-08-16 DIAGNOSIS — I73.9 CLAUDICATION (HCC): ICD-10-CM

## 2023-08-16 DIAGNOSIS — E78.5 DYSLIPIDEMIA: ICD-10-CM

## 2023-08-16 PROCEDURE — G8417 CALC BMI ABV UP PARAM F/U: HCPCS | Performed by: INTERNAL MEDICINE

## 2023-08-16 PROCEDURE — 3017F COLORECTAL CA SCREEN DOC REV: CPT | Performed by: INTERNAL MEDICINE

## 2023-08-16 PROCEDURE — 3078F DIAST BP <80 MM HG: CPT | Performed by: INTERNAL MEDICINE

## 2023-08-16 PROCEDURE — 1123F ACP DISCUSS/DSCN MKR DOCD: CPT | Performed by: INTERNAL MEDICINE

## 2023-08-16 PROCEDURE — 99214 OFFICE O/P EST MOD 30 MIN: CPT | Performed by: INTERNAL MEDICINE

## 2023-08-16 PROCEDURE — 1036F TOBACCO NON-USER: CPT | Performed by: INTERNAL MEDICINE

## 2023-08-16 PROCEDURE — G8427 DOCREV CUR MEDS BY ELIG CLIN: HCPCS | Performed by: INTERNAL MEDICINE

## 2023-08-16 PROCEDURE — 3074F SYST BP LT 130 MM HG: CPT | Performed by: INTERNAL MEDICINE

## 2023-08-16 RX ORDER — ASPIRIN 81 MG/1
81 TABLET ORAL DAILY
COMMUNITY
Start: 2023-08-16

## 2023-08-16 RX ORDER — CLOPIDOGREL BISULFATE 75 MG/1
TABLET ORAL
Qty: 30 TABLET | Refills: 3 | Status: SHIPPED | OUTPATIENT
Start: 2023-08-16 | End: 2024-08-16

## 2023-08-22 NOTE — PROGRESS NOTES
car, while stopped for a few minutes in traffic 0 0 0 0   [place] Sleepiness Score 7 12 0 0             EPWORTH SCALE :  Sleep Medicine 8/23/2023 6/19/2023 6/30/2022 4/25/2022   Sitting and reading 1 2 0 0   Watching TV 1 2 0 0   Sitting, inactive in a public place (e.g. a theatre or a meeting) 2 2 0 0   As a passenger in a car for an hour without a break 1 2 0 0   Lying down to rest in the afternoon when circumstances permit 1 2 0 0   Sitting and talking to someone 0 0 0 0   Sitting quietly after a lunch without alcohol 1 2 0 0   In a car, while stopped for a few minutes in traffic 0 0 0 0   [place] Sleepiness Score 7 12 0 0        DIAGNOSTIC TESTS:  Sleep Study- HST 5/29/20  AHI 28.3  Sao2 51%  No flowsheet data found.          Past Medical History:   Diagnosis Date    Acute kidney failure, unspecified ([address])     hx of r/t ibuprofen     MELANIE (acute kidney injury) ([address]) 3/12/2013    Arthritis of left shoulder region left    has bone spur     BBB (bundle branch block) 09/2017    Right    Cancer ([address])     Basal cell on back    COPD, mild ([address]) 11/28/2017    Depression     patient denies    Dyslipidemia     GERD (gastroesophageal reflux disease)     controlled with med    Hemangioma of skin     left leg/ upper thigh    History of esophageal dilatation     due to stricture    History of fracture of foot right    may 10th seen at  and 2month later reoccurred and has bad arthritis on MCP     HTN (hypertension)     Hypertonicity of bladder     states \"overactive bladder\"    Hypertrophy of prostate with urinary obstruction and other lower urinary tract symptoms (LUTS)     denies    Impotence of organic origin     Leg pain     left leg    Low serum testosterone level     TERENCE (obstructive sleep apnea) 11/28/2017    No CPAP    Osteoarthrosis, unspecified whether generalized or localized, unspecified site     back/ R great toe    Unspecified disorder of kidney and ureter 2008    states related to bp          Patient Active

## 2023-08-23 ENCOUNTER — OFFICE VISIT (OUTPATIENT)
Dept: SLEEP MEDICINE | Age: 68
End: 2023-08-23
Payer: MEDICARE

## 2023-08-23 VITALS
DIASTOLIC BLOOD PRESSURE: 74 MMHG | OXYGEN SATURATION: 96 % | RESPIRATION RATE: 14 BRPM | HEART RATE: 72 BPM | WEIGHT: 202 LBS | HEIGHT: 72 IN | SYSTOLIC BLOOD PRESSURE: 124 MMHG | BODY MASS INDEX: 27.36 KG/M2

## 2023-08-23 DIAGNOSIS — G47.33 OSA (OBSTRUCTIVE SLEEP APNEA): Primary | ICD-10-CM

## 2023-08-23 DIAGNOSIS — G47.00 INSOMNIA, UNSPECIFIED TYPE: ICD-10-CM

## 2023-08-23 PROCEDURE — 3078F DIAST BP <80 MM HG: CPT | Performed by: INTERNAL MEDICINE

## 2023-08-23 PROCEDURE — 99215 OFFICE O/P EST HI 40 MIN: CPT | Performed by: INTERNAL MEDICINE

## 2023-08-23 PROCEDURE — 95977 ALYS CPLX CN NPGT PRGRMG: CPT | Performed by: INTERNAL MEDICINE

## 2023-08-23 PROCEDURE — G8427 DOCREV CUR MEDS BY ELIG CLIN: HCPCS | Performed by: INTERNAL MEDICINE

## 2023-08-23 PROCEDURE — 3074F SYST BP LT 130 MM HG: CPT | Performed by: INTERNAL MEDICINE

## 2023-08-23 PROCEDURE — 1036F TOBACCO NON-USER: CPT | Performed by: INTERNAL MEDICINE

## 2023-08-23 PROCEDURE — G8417 CALC BMI ABV UP PARAM F/U: HCPCS | Performed by: INTERNAL MEDICINE

## 2023-08-23 PROCEDURE — 1123F ACP DISCUSS/DSCN MKR DOCD: CPT | Performed by: INTERNAL MEDICINE

## 2023-08-23 PROCEDURE — 3017F COLORECTAL CA SCREEN DOC REV: CPT | Performed by: INTERNAL MEDICINE

## 2023-08-23 RX ORDER — ZOLPIDEM TARTRATE 1.75 MG/1
TABLET SUBLINGUAL
Qty: 15 TABLET | Refills: 1 | Status: SHIPPED | OUTPATIENT
Start: 2023-08-23 | End: 2023-10-12

## 2023-08-23 ASSESSMENT — SLEEP AND FATIGUE QUESTIONNAIRES
HOW LIKELY ARE YOU TO NOD OFF OR FALL ASLEEP WHILE WATCHING TV: 1
HOW LIKELY ARE YOU TO NOD OFF OR FALL ASLEEP WHILE LYING DOWN TO REST IN THE AFTERNOON WHEN CIRCUMSTANCES PERMIT: 1
HOW LIKELY ARE YOU TO NOD OFF OR FALL ASLEEP WHILE SITTING QUIETLY AFTER LUNCH WITHOUT ALCOHOL: 1
HOW LIKELY ARE YOU TO NOD OFF OR FALL ASLEEP WHILE SITTING INACTIVE IN A PUBLIC PLACE: 2
HOW LIKELY ARE YOU TO NOD OFF OR FALL ASLEEP WHILE SITTING AND TALKING TO SOMEONE: 0
HOW LIKELY ARE YOU TO NOD OFF OR FALL ASLEEP WHILE SITTING AND READING: 1
HOW LIKELY ARE YOU TO NOD OFF OR FALL ASLEEP WHEN YOU ARE A PASSENGER IN A CAR FOR AN HOUR WITHOUT A BREAK: 1
HOW LIKELY ARE YOU TO NOD OFF OR FALL ASLEEP IN A CAR, WHILE STOPPED FOR A FEW MINUTES IN TRAFFIC: 0
ESS TOTAL SCORE: 7

## 2023-08-28 ENCOUNTER — TELEPHONE (OUTPATIENT)
Age: 68
End: 2023-08-28

## 2023-08-28 DIAGNOSIS — I73.9 PERIPHERAL ARTERY DISEASE (HCC): Primary | ICD-10-CM

## 2023-08-28 DIAGNOSIS — I73.9 BILATERAL CLAUDICATION OF LOWER LIMB (HCC): ICD-10-CM

## 2023-08-28 NOTE — TELEPHONE ENCOUNTER
I called and explained 's response. He voiced understanding and stated he would like to schedule CTA with runoff.

## 2023-08-28 NOTE — TELEPHONE ENCOUNTER
----- Message from Claudette Birmingham, MD sent at 8/25/2023  1:56 PM EDT -----  Please notify patient, arterial doppler suggests possible occlusion of the right femoral artery and > 75% blockage of the left femoral artery. Could be contributing to hip pain. CTA with runoff is recommended. Won't order until he's notified. If agreeable, leg me know and will enter the order. Thanks. Information copied to patient via My Chart as well.

## 2023-08-30 DIAGNOSIS — G47.00 INSOMNIA, UNSPECIFIED TYPE: ICD-10-CM

## 2023-08-30 RX ORDER — TRAZODONE HYDROCHLORIDE 150 MG/1
TABLET ORAL
Qty: 30 TABLET | Refills: 5 | Status: SHIPPED | OUTPATIENT
Start: 2023-08-30

## 2023-08-30 NOTE — TELEPHONE ENCOUNTER
Patient is calling about 2 prescriptions that was suppose to be called in for him last week.   Dr. Chika Priest was increasing the [facility]) and another medicine that was suppose to go under your tongue

## 2023-09-12 ENCOUNTER — HOSPITAL ENCOUNTER (OUTPATIENT)
Dept: CT IMAGING | Age: 68
Discharge: HOME | End: 2023-09-15
Attending: INTERNAL MEDICINE
Payer: MEDICARE

## 2023-09-12 ENCOUNTER — TELEPHONE (OUTPATIENT)
Age: 68
End: 2023-09-12

## 2023-09-12 DIAGNOSIS — I73.9 PERIPHERAL ARTERY DISEASE (HCC): ICD-10-CM

## 2023-09-12 DIAGNOSIS — I73.9 PERIPHERAL ARTERY DISEASE (HCC): Primary | ICD-10-CM

## 2023-09-12 DIAGNOSIS — I73.9 BILATERAL CLAUDICATION OF LOWER LIMB (HCC): ICD-10-CM

## 2023-09-12 LAB — CREAT BLD-MCNC: 1.03 MG/DL (ref 0.8–1.5)

## 2023-09-12 PROCEDURE — 82565 ASSAY OF CREATININE: CPT

## 2023-09-12 PROCEDURE — 75635 CT ANGIO ABDOMINAL ARTERIES: CPT

## 2023-09-12 PROCEDURE — 6360000004 HC RX CONTRAST MEDICATION: Performed by: INTERNAL MEDICINE

## 2023-09-12 RX ADMIN — IOPAMIDOL 100 ML: 755 INJECTION, SOLUTION INTRAVENOUS at 11:19

## 2023-09-12 NOTE — RESULT ENCOUNTER NOTE
Left sided stents in the leg are open. The right femoral artery is 100% blocked, reroutes itself further down. Possibly contributing to some of his hip pain. On good therapy, LDL at goal (< 55). Recommend referral to vascular. They may well just continue to watch but would like their opinion regarding other therapies if felt valuable. I will enter the referral order.  Thanks

## 2023-09-12 NOTE — TELEPHONE ENCOUNTER
----- Message from Ray Will MD sent at 9/12/2023 12:57 PM EDT -----  Left sided stents in the leg are open. The right femoral artery is 100% blocked, reroutes itself further down. Possibly contributing to some of his hip pain. On good therapy, LDL at goal (< 55). Recommend referral to vascular. They may well just continue to watch but would like their opinion regarding other therapies if felt valuable. I will enter the referral order.  Thanks

## 2023-09-12 NOTE — TELEPHONE ENCOUNTER
Patient called stating he had an earlier CT procedure today 9/12. Patient states the results were abnormal. Patient states he is very concerned and would like further explanation and explore the next plan of action. Please call and advise.

## 2023-09-15 ENCOUNTER — OFFICE VISIT (OUTPATIENT)
Dept: VASCULAR SURGERY | Age: 68
End: 2023-09-15
Payer: MEDICARE

## 2023-09-15 VITALS
OXYGEN SATURATION: 95 % | BODY MASS INDEX: 27.5 KG/M2 | SYSTOLIC BLOOD PRESSURE: 119 MMHG | DIASTOLIC BLOOD PRESSURE: 75 MMHG | HEIGHT: 72 IN | HEART RATE: 95 BPM | WEIGHT: 203 LBS

## 2023-09-15 DIAGNOSIS — I73.9 PVD (PERIPHERAL VASCULAR DISEASE) WITH CLAUDICATION (HCC): Primary | ICD-10-CM

## 2023-09-15 PROCEDURE — 1123F ACP DISCUSS/DSCN MKR DOCD: CPT | Performed by: SURGERY

## 2023-09-15 PROCEDURE — 3074F SYST BP LT 130 MM HG: CPT | Performed by: SURGERY

## 2023-09-15 PROCEDURE — G8417 CALC BMI ABV UP PARAM F/U: HCPCS | Performed by: SURGERY

## 2023-09-15 PROCEDURE — G8428 CUR MEDS NOT DOCUMENT: HCPCS | Performed by: SURGERY

## 2023-09-15 PROCEDURE — 99204 OFFICE O/P NEW MOD 45 MIN: CPT | Performed by: SURGERY

## 2023-09-15 PROCEDURE — 3017F COLORECTAL CA SCREEN DOC REV: CPT | Performed by: SURGERY

## 2023-09-15 PROCEDURE — 3078F DIAST BP <80 MM HG: CPT | Performed by: SURGERY

## 2023-09-15 PROCEDURE — 1036F TOBACCO NON-USER: CPT | Performed by: SURGERY

## 2023-09-15 NOTE — PROGRESS NOTES
Abdominal: Soft. Bowel sounds are normal. he exhibits no distension. There is no tenderness. There is no guarding. No hernia. Musculoskeletal: Normal range of motion. Neurological: He is alert. CN II- XII grossly intact  Skin: Warm and dry. Vascular: Femoral pulses Doppler pedal pulses      Imaging Results  CTA showed a right SFA occlusion with reconsult above-knee pop with anterior tibial posterior predominant runoff to the foot. ASSESSMENT AND PLAN   Mr. Milana Gupta is a 77y.o. year old male with a right SFA occlusion with mild claudication symptoms. Patient still well-perfused with Doppler anterior tibial posterior tibial signals. Does not have any evidence any tissue loss or debilitating claudication. We did recommend aspirin and cholesterol medication follow-up in 6 months with repeat duplex study. No indication for intervention at this time. Elements of this note have been dictated using speech recognition software. As a result, errors of speech recognition may have occurred. 50 minutes of time was spent on this consult with greater than 50% of time spent face-to-face with the patient discussing the disease process, education and treatment options.

## 2023-10-05 RX ORDER — TRAZODONE HYDROCHLORIDE 50 MG/1
TABLET ORAL
Qty: 180 TABLET | OUTPATIENT
Start: 2023-10-05

## 2024-03-19 ENCOUNTER — OFFICE VISIT (OUTPATIENT)
Dept: VASCULAR SURGERY | Age: 69
End: 2024-03-19
Payer: MEDICARE

## 2024-03-19 VITALS
BODY MASS INDEX: 27.9 KG/M2 | WEIGHT: 206 LBS | SYSTOLIC BLOOD PRESSURE: 135 MMHG | DIASTOLIC BLOOD PRESSURE: 76 MMHG | HEART RATE: 73 BPM | HEIGHT: 72 IN | OXYGEN SATURATION: 96 %

## 2024-03-19 DIAGNOSIS — I73.9 PVD (PERIPHERAL VASCULAR DISEASE) WITH CLAUDICATION (HCC): Primary | ICD-10-CM

## 2024-03-19 PROCEDURE — G8427 DOCREV CUR MEDS BY ELIG CLIN: HCPCS | Performed by: SURGERY

## 2024-03-19 PROCEDURE — 3078F DIAST BP <80 MM HG: CPT | Performed by: SURGERY

## 2024-03-19 PROCEDURE — G8417 CALC BMI ABV UP PARAM F/U: HCPCS | Performed by: SURGERY

## 2024-03-19 PROCEDURE — 99213 OFFICE O/P EST LOW 20 MIN: CPT | Performed by: SURGERY

## 2024-03-19 PROCEDURE — 3017F COLORECTAL CA SCREEN DOC REV: CPT | Performed by: SURGERY

## 2024-03-19 PROCEDURE — G8484 FLU IMMUNIZE NO ADMIN: HCPCS | Performed by: SURGERY

## 2024-03-19 PROCEDURE — 1123F ACP DISCUSS/DSCN MKR DOCD: CPT | Performed by: SURGERY

## 2024-03-19 PROCEDURE — 1036F TOBACCO NON-USER: CPT | Performed by: SURGERY

## 2024-03-19 PROCEDURE — 3074F SYST BP LT 130 MM HG: CPT | Performed by: SURGERY

## 2024-03-19 NOTE — PROGRESS NOTES
a patent iliac stent        Recommendations/Plans:   Mr. Marcus De León is a 69 y.o. year old male asymptomatic bilateral carotid stenosis 50 to 69% unchanged.  Patient is asymptomatic no evidence of any claudication he is well-perfused with toe pressure greater than 80 mmHg.  Continue his aspirin and cholesterol medication follow-up in 2 months with duplex study.    TRIPP LOCKE MD    Elements of this note have been dictated using speech recognition software. As a result, errors of speech recognition may have occurred.    20 minutes of time was spent on this encounter including chart review, assessment, evaluation and coordination of patient care

## 2024-05-31 ENCOUNTER — TELEPHONE (OUTPATIENT)
Age: 69
End: 2024-05-31

## 2024-05-31 NOTE — TELEPHONE ENCOUNTER
Kadie Carter MD   to Lindsay Ayala MA BM    5/31/24  9:33 AM  Endoscopic procedures are low risk procedures.  For patients taking anticoagulant for atrial fibrillation, and who have not had cardioversion in the preceding 6 weeks or prior embolic stroke, it is low risk to hold:    Apixaban (Eliquis) for 24-48 hours prior. Resume 24 hours post unless there is a contraindication  Rivaroxoban (Xarelto) for 24-72 hours prior Resume 24 hours post unless there is a contraindication  Dabigatran (Pradaxa) for 24-72 hours prior Resume 24 hours post unless there is a contraindication  Warfarin (Coumadin/Jantoven) for 2-3 days prior. Resume at usual dose same day of procedure and notify anticoagulation clinic.    If the patient is anticoagulated for a mechanical valve, please evaluate with the primary cardiologist.  If the patient's anticoagulation regimen is administered or followed by another provider, please contact that practice.    Dental procedures are low risk procedures.  For patients taking anticoagulant for atrial fibrillation, and who have not had cardioversion in the preceding 6 weeks, it is low risk to hold:    For extraction of a single tooth - do not hold anticoagulants.  For extraction of multiple teeth, or more extensive oral procedures:    Apixaban (Eliquis) for 24-48 hours prior. Resume 24 hours post unless there is a contraindication  Rivaroxoban (Xarelto) for 24-72 hours prior Resume 24 hours post unless there is a contraindication  Dabigatran (Pradaxa) for 24-72 hours prior Resume 24 hours post unless there is a contraindication  Warfarin (Coumadin/Jantoven) for 2-3 days prior. Resume at usual dose same day of procedure and notify anticoagulation clinic.    If the patient is anticoagulated for a mechanical valve, please evaluate with the primary cardiologist.  If the patient's anticoagulation regimen is administered or followed by another provider, please contact that

## 2024-09-24 ENCOUNTER — OFFICE VISIT (OUTPATIENT)
Dept: VASCULAR SURGERY | Age: 69
End: 2024-09-24
Payer: MEDICARE

## 2024-09-24 VITALS
BODY MASS INDEX: 27.9 KG/M2 | OXYGEN SATURATION: 91 % | HEIGHT: 72 IN | SYSTOLIC BLOOD PRESSURE: 155 MMHG | WEIGHT: 206 LBS | DIASTOLIC BLOOD PRESSURE: 89 MMHG | HEART RATE: 62 BPM

## 2024-09-24 DIAGNOSIS — I73.9 PVD (PERIPHERAL VASCULAR DISEASE) WITH CLAUDICATION (HCC): Primary | ICD-10-CM

## 2024-09-24 PROCEDURE — 3075F SYST BP GE 130 - 139MM HG: CPT | Performed by: SURGERY

## 2024-09-24 PROCEDURE — 1036F TOBACCO NON-USER: CPT | Performed by: SURGERY

## 2024-09-24 PROCEDURE — G8427 DOCREV CUR MEDS BY ELIG CLIN: HCPCS | Performed by: SURGERY

## 2024-09-24 PROCEDURE — 3017F COLORECTAL CA SCREEN DOC REV: CPT | Performed by: SURGERY

## 2024-09-24 PROCEDURE — 3078F DIAST BP <80 MM HG: CPT | Performed by: SURGERY

## 2024-09-24 PROCEDURE — 1123F ACP DISCUSS/DSCN MKR DOCD: CPT | Performed by: SURGERY

## 2024-09-24 PROCEDURE — G8417 CALC BMI ABV UP PARAM F/U: HCPCS | Performed by: SURGERY

## 2024-09-24 PROCEDURE — 99213 OFFICE O/P EST LOW 20 MIN: CPT | Performed by: SURGERY

## 2024-10-28 ENCOUNTER — OFFICE VISIT (OUTPATIENT)
Dept: ORTHOPEDIC SURGERY | Age: 69
End: 2024-10-28
Payer: MEDICARE

## 2024-10-28 DIAGNOSIS — M47.816 LUMBAR SPONDYLOSIS: Primary | ICD-10-CM

## 2024-10-28 PROCEDURE — G8417 CALC BMI ABV UP PARAM F/U: HCPCS | Performed by: PHYSICIAN ASSISTANT

## 2024-10-28 PROCEDURE — 3017F COLORECTAL CA SCREEN DOC REV: CPT | Performed by: PHYSICIAN ASSISTANT

## 2024-10-28 PROCEDURE — 1123F ACP DISCUSS/DSCN MKR DOCD: CPT | Performed by: PHYSICIAN ASSISTANT

## 2024-10-28 PROCEDURE — 99204 OFFICE O/P NEW MOD 45 MIN: CPT | Performed by: PHYSICIAN ASSISTANT

## 2024-10-28 PROCEDURE — G8428 CUR MEDS NOT DOCUMENT: HCPCS | Performed by: PHYSICIAN ASSISTANT

## 2024-10-28 PROCEDURE — 1036F TOBACCO NON-USER: CPT | Performed by: PHYSICIAN ASSISTANT

## 2024-10-28 PROCEDURE — G8484 FLU IMMUNIZE NO ADMIN: HCPCS | Performed by: PHYSICIAN ASSISTANT

## 2024-10-28 NOTE — PROGRESS NOTES
Name: Marcus De León  YOB: 1955  Gender: male  MRN: [id number]    CC:   Chief Complaint   Patient presents with    New Patient     Low back and right leg pain         HPI:   Marcus De León is a 69 y.o. male with a PMHx of previous lumbar spine surgery with Dr. Hartley around 2019, chronic lower back pain, CAD and peripheral artery disease status post cardiac and peripheral stents on Plavix.      They present here for evaluation of lower back pain.  He is under the care of pain management at [facility] with Candi.  He recently underwent lumbar injection therapy earlier this year which made his back pain worse.  He is having difficulty getting around and take care of his home.  He unfortunately cannot have an MRI due to the presence of vascular stents and an implanted breathing device Inspire.  He does not have any severe radicular features at this time like he did years ago before his back surgery.  The symptoms resolved after surgery.  He just has persistent worsening lower back pain.  He takes Norco primarily at night.  He has tried other multiple medications without much meaningful improvement.  He has been doing a home exercise program and exercises at the [facility] for several months now.  He unfortunately had to stop doing his exercises at home last month because it was exacerbating his pain.  Pain level at times reaches a 10 out of 10.  He is having trouble sleeping.          Past Medical History Includes:   Past Medical History:   Diagnosis Date    Acute kidney failure, unspecified (HCC)     hx of r/t ibuprofen     MELANIE (acute kidney injury) ([facility]) 3/12/2013    Arthritis of left shoulder region left    has bone spur     BBB (bundle branch block) 09/2017    Right    Cancer (HCC)     Basal cell on back    COPD, mild ([facility]) 11/28/2017    Depression     patient denies    Dyslipidemia     GERD (gastroesophageal reflux disease)     controlled with med    Hemangioma of skin     left leg/ upper thigh

## 2024-11-05 ENCOUNTER — HOSPITAL ENCOUNTER (OUTPATIENT)
Dept: INTERVENTIONAL RADIOLOGY/VASCULAR | Age: 69
Discharge: HOME | End: 2024-11-08
Payer: MEDICARE

## 2024-11-05 ENCOUNTER — HOSPITAL ENCOUNTER (OUTPATIENT)
Dept: CT IMAGING | Age: 69
Discharge: HOME | End: 2024-11-08
Payer: MEDICARE

## 2024-11-05 VITALS
HEART RATE: 79 BPM | SYSTOLIC BLOOD PRESSURE: 145 MMHG | TEMPERATURE: 97.8 F | DIASTOLIC BLOOD PRESSURE: 74 MMHG | RESPIRATION RATE: 18 BRPM | OXYGEN SATURATION: 92 %

## 2024-11-05 DIAGNOSIS — M47.816 LUMBAR SPONDYLOSIS: ICD-10-CM

## 2024-11-05 PROCEDURE — 2500000003 HC RX 250 WO HCPCS: Performed by: PHYSICIAN ASSISTANT

## 2024-11-05 PROCEDURE — 6360000004 HC RX CONTRAST MEDICATION: Performed by: PHYSICIAN ASSISTANT

## 2024-11-05 PROCEDURE — 2709999900 IR MYELOGRAM LUMBOSACRAL

## 2024-11-05 PROCEDURE — 62304 MYELOGRAPHY LUMBAR INJECTION: CPT

## 2024-11-05 PROCEDURE — 62304 MYELOGRAPHY LUMBAR INJECTION: CPT | Performed by: RADIOLOGY

## 2024-11-05 PROCEDURE — 72132 CT LUMBAR SPINE W/DYE: CPT

## 2024-11-05 RX ORDER — IOPAMIDOL 408 MG/ML
INJECTION, SOLUTION INTRATHECAL PRN
Status: COMPLETED | OUTPATIENT
Start: 2024-11-05 | End: 2024-11-05

## 2024-11-05 RX ORDER — LIDOCAINE HYDROCHLORIDE 20 MG/ML
INJECTION, SOLUTION INFILTRATION; PERINEURAL PRN
Status: COMPLETED | OUTPATIENT
Start: 2024-11-05 | End: 2024-11-05

## 2024-11-05 RX ADMIN — IOPAMIDOL 15 ML: 408 INJECTION, SOLUTION INTRATHECAL at 11:58

## 2024-11-05 RX ADMIN — LIDOCAINE HYDROCHLORIDE 5 ML: 20 INJECTION, SOLUTION INFILTRATION; PERINEURAL at 11:52

## 2024-11-05 ASSESSMENT — PAIN - FUNCTIONAL ASSESSMENT: PAIN_FUNCTIONAL_ASSESSMENT: NONE - DENIES PAIN

## 2024-11-05 NOTE — FLOWSHEET NOTE
Recovery period without difficulty. Pt alert and oriented and denies pain. Dressing is clean, dry, and intact. Reviewed discharge instructions with patient and wife, both verbalized understanding. Patient declined wheelchair to lobby.

## 2024-11-05 NOTE — OR NURSING
TRANSFER - OUT REPORT:           Verbal report given to GABRIELLE Kaplan(name) on Marcus De León  being transferred to IR Recovery 7(unit) for  routine post-op            Report consisted of patient’s Situation, Background, Assessment and      Recommendations(SBAR).          Information from the following report(s) SBAR and Procedure Summary was reviewed with the receiving nurse.       Opportunity for questions and clarification was provided.      Pt tolerated procedure well.

## 2024-11-05 NOTE — DISCHARGE INSTRUCTIONS
If you have any questions about your procedure, please call the Interventional Radiology department at [phone]    After business hours (5pm) and weekends, call the answering service at [phone] and ask for the Radiologist on call to be paged.

## 2024-11-05 NOTE — OP NOTE
I, Chloe Drew PA-C, hereby attest that the medical record entry for myelogram 11/5/24  accurately reflects notations that I made in my capacity as a PA when I treated the above listed Medicare beneficiary. I do hereby attest that this information is true, accurate and complete to the best of my knowledge and I understand that any falsification, omission, or concealment of material fact may subject me to administrative, civil, or criminal liability.

## 2024-11-12 ENCOUNTER — OFFICE VISIT (OUTPATIENT)
Age: 69
End: 2024-11-12

## 2024-11-12 VITALS — BODY MASS INDEX: 27.09 KG/M2 | HEIGHT: 72 IN | WEIGHT: 200 LBS

## 2024-11-12 DIAGNOSIS — M54.9 BACK PAIN, UNSPECIFIED BACK LOCATION, UNSPECIFIED BACK PAIN LATERALITY, UNSPECIFIED CHRONICITY: Primary | ICD-10-CM

## 2024-11-12 RX ORDER — FEXOFENADINE HCL AND PSEUDOEPHEDRINE HCL 180; 240 MG/1; MG/1
1 TABLET, EXTENDED RELEASE ORAL DAILY
COMMUNITY
Start: 2024-07-01

## 2024-11-12 RX ORDER — HYDROCODONE BITARTRATE AND ACETAMINOPHEN 10; 325 MG/1; MG/1
TABLET ORAL
COMMUNITY
Start: 2024-11-10

## 2024-11-12 NOTE — PROGRESS NOTES
Name: Marcus De León  YOB: 1955  Gender: male  MRN: [id number]  Age: 69 y.o.      Chief Complaint: Back pain and bilateral buttock pain    History of Present Illness:      This is a very pleasant 69 y.o. male who presents with a history of an L2-L5 laminectomy done in 2019.  He states that since that time he has had back pain and bilateral hip pain.  Pain is 5/10 in severity.  Symptoms are worse with standing straight up or walking.  Pain is relieved with leaning forward or sitting down.  He also has a significant past medical history of coronary artery disease, peripheral vascular disease followed by Dr. Landeros, cardiac and peripheral stents placed, inspire implant  breathing device.  He does take Plavix and aspirin.  He does not smoke.  He does not have diabetes.  He states pain is 5/10 in severity.  He states he is able to deal with his pain.  He has had the symptoms since his low back surgery.  He is currently in pain management.  Medications:       Current Outpatient Medications:     fexofenadine-pseudoephedrine (ALLEGRA-D 24HR) 180-240 MG per extended release tablet, Take 1 tablet by mouth daily, Disp: , Rfl:     HYDROcodone-acetaminophen (NORCO)  MG per tablet, , Disp: , Rfl:     traZODone (DESYREL) 150 MG tablet, Take 1 tablet 30 minutes prior to sleep, Disp: 30 tablet, Rfl: 5    aspirin 81 MG EC tablet, Take 1 tablet by mouth daily, Disp: , Rfl:     metoprolol succinate (TOPROL XL) 50 MG extended release tablet, Take 1 tablet by mouth 2 times daily, Disp: 30 tablet, Rfl: 5    nitroGLYCERIN (NITROSTAT) 0.4 MG SL tablet, Place 1 tablet under the tongue every 5 minutes as needed for Chest pain up to max of 3 total doses. If no relief after 1 dose, call 911., Disp: 25 tablet, Rfl: 2    atorvastatin (LIPITOR) 40 MG tablet, Take 1 tablet by mouth daily, Disp: 30 tablet, Rfl: 11    albuterol sulfate HFA (PROVENTIL;VENTOLIN;PROAIR) 108 (90 Base) MCG/ACT inhaler, Inhale 2 puffs into the

## 2025-03-28 ENCOUNTER — OFFICE VISIT (OUTPATIENT)
Dept: VASCULAR SURGERY | Age: 70
End: 2025-03-28
Payer: MEDICARE

## 2025-03-28 VITALS
HEIGHT: 72 IN | HEART RATE: 80 BPM | OXYGEN SATURATION: 92 % | DIASTOLIC BLOOD PRESSURE: 78 MMHG | BODY MASS INDEX: 27.9 KG/M2 | WEIGHT: 206 LBS | SYSTOLIC BLOOD PRESSURE: 129 MMHG

## 2025-03-28 DIAGNOSIS — I73.9 PVD (PERIPHERAL VASCULAR DISEASE) WITH CLAUDICATION: Primary | ICD-10-CM

## 2025-03-28 PROCEDURE — 3078F DIAST BP <80 MM HG: CPT | Performed by: SURGERY

## 2025-03-28 PROCEDURE — 1159F MED LIST DOCD IN RCRD: CPT | Performed by: SURGERY

## 2025-03-28 PROCEDURE — 3074F SYST BP LT 130 MM HG: CPT | Performed by: SURGERY

## 2025-03-28 PROCEDURE — G8417 CALC BMI ABV UP PARAM F/U: HCPCS | Performed by: SURGERY

## 2025-03-28 PROCEDURE — 99213 OFFICE O/P EST LOW 20 MIN: CPT | Performed by: SURGERY

## 2025-03-28 PROCEDURE — 1036F TOBACCO NON-USER: CPT | Performed by: SURGERY

## 2025-03-28 PROCEDURE — G2211 COMPLEX E/M VISIT ADD ON: HCPCS | Performed by: SURGERY

## 2025-03-28 PROCEDURE — 1123F ACP DISCUSS/DSCN MKR DOCD: CPT | Performed by: SURGERY

## 2025-03-28 PROCEDURE — G8427 DOCREV CUR MEDS BY ELIG CLIN: HCPCS | Performed by: SURGERY

## 2025-03-28 PROCEDURE — 3017F COLORECTAL CA SCREEN DOC REV: CPT | Performed by: SURGERY

## 2025-03-28 NOTE — PROGRESS NOTES
[address] [address]  [phone] FAX: [phone]    Marcus De León  : 1955    Chief Complaint:     History of Present Illness:   Patient follows up today for follow-up duplex study.  Patient has history of a left iliac angioplasty stent years ago.  He has mild claudication symptoms.    CURRENT MEDICATIONS:  Current Outpatient Medications   Medication Sig Dispense Refill    HYDROcodone-acetaminophen (NORCO)  MG per tablet       traZODone (DESYREL) 150 MG tablet Take 1 tablet 30 minutes prior to sleep 30 tablet 5    clopidogrel (PLAVIX) 75 MG tablet Take 4 tablets by mouth daily for 1 day, THEN 1 tablet daily. 30 tablet 3    aspirin 81 MG EC tablet Take 1 tablet by mouth daily      lisinopril (PRINIVIL;ZESTRIL) 20 MG tablet Take 1 tablet by mouth in the morning and at bedtime 60 tablet 0    amLODIPine (NORVASC) 10 MG tablet Take 1 tablet by mouth daily 30 tablet 0    metoprolol succinate (TOPROL XL) 50 MG extended release tablet Take 1 tablet by mouth 2 times daily 30 tablet 5    nitroGLYCERIN (NITROSTAT) 0.4 MG SL tablet Place 1 tablet under the tongue every 5 minutes as needed for Chest pain up to max of 3 total doses. If no relief after 1 dose, call 911. 25 tablet 2    atorvastatin (LIPITOR) 40 MG tablet Take 1 tablet by mouth daily 30 tablet 11    albuterol sulfate HFA (PROVENTIL;VENTOLIN;PROAIR) 108 (90 Base) MCG/ACT inhaler Inhale 2 puffs into the lungs every 6 hours as needed for Wheezing 1 each 11    Multiple Vitamin (MULTI-VITAMIN PO) Take by mouth      Omega-3 Fatty Acids (OMEGA 3 PO) Take 1 capsule by mouth      omeprazole (PRILOSEC) 20 MG delayed release capsule Take 1 capsule by mouth daily      testosterone cypionate (DEPOTESTOTERONE CYPIONATE) 200 MG/ML injection Inject 1 mL into the muscle every 14 days.       No current facility-administered medications for this visit.       Past Medical History:   Diagnosis Date    Acute kidney failure,

## (undated) DEVICE — BNDG ELAS COBAN 2INX5YD NS --

## (undated) DEVICE — DRSG GZ OIL EMUL CURAD 3X8 --

## (undated) DEVICE — Device

## (undated) DEVICE — DRAIN KT WND 10FR RND 400ML --

## (undated) DEVICE — SOLUTION IV 1000ML 0.9% SOD CHL

## (undated) DEVICE — SUTURE VCRL SZ 2-0 L27IN ABSRB UD L26MM CT-2 1/2 CIR J269H

## (undated) DEVICE — BAND COMPR L24CM REG CLR PLAS HEMSTAT EXT HK AND LOOP RETEN

## (undated) DEVICE — CATH BLLN ANGIO 2.25X12MM SC EUPHORA RX

## (undated) DEVICE — CATHETER GUID 6FR L100CM GRN PTFE JR4 TRUELUMEN HYBRID

## (undated) DEVICE — BUTTON SWITCH PENCIL BLADE ELECTRODE, HOLSTER: Brand: EDGE

## (undated) DEVICE — SUTURE VCRL SZ 1 L27IN ABSRB UD L36MM CP-1 1/2 CIR REV CUT J268H

## (undated) DEVICE — INTENDED FOR TISSUE SEPARATION, AND OTHER PROCEDURES THAT REQUIRE A SHARP SURGICAL BLADE TO PUNCTURE OR CUT.: Brand: BARD-PARKER SAFETY BLADES SIZE 15, STERILE

## (undated) DEVICE — 3M™ STERI-STRIP™ REINFORCED ADHESIVE SKIN CLOSURES, R1548, 1 IN X 5 IN (25 MM X 125 MM), 4 STRIPS/ENVELOPE: Brand: 3M™ STERI-STRIP™

## (undated) DEVICE — NEEDLE HYPO 21GA L1.5IN INTRAMUSCULAR S STL LATCH BVL UP

## (undated) DEVICE — PADDING CAST W2INXL4YD ST COT COHESIVE HND TEARABLE SPEC

## (undated) DEVICE — CATH BLLN ANGIO 2.50X15MM SC EUPHORA RX

## (undated) DEVICE — GUIDEWIRE 035IN 210CM PTFE COAT FIX COR J TIP 15MM FIRM BODY

## (undated) DEVICE — REM POLYHESIVE ADULT PATIENT RETURN ELECTRODE: Brand: VALLEYLAB

## (undated) DEVICE — DRAPE XR C ARM 41X74IN LF --

## (undated) DEVICE — HI-TORQUE PILOT 50 GUIDE WIRE .014 J TIP 3.0 CM X 190 CM: Brand: HI-TORQUE PILOT

## (undated) DEVICE — DISPOSABLE TOURNIQUET CUFF SINGLE BLADDER, DUAL PORT AND QUICK CONNECT CONNECTOR: Brand: COLOR CUFF

## (undated) DEVICE — KENDALL SCD EXPRESS SLEEVES, KNEE LENGTH, LARGE: Brand: KENDALL SCD

## (undated) DEVICE — CATHETER COR DIAG 4.0 5FR 110CM 2 SIDE H

## (undated) DEVICE — (D)PREP SKN CHLRAPRP APPL 26ML -- CONVERT TO ITEM 371833

## (undated) DEVICE — KENDALL SCD EXPRESS SLEEVES, KNEE LENGTH, MEDIUM: Brand: KENDALL SCD

## (undated) DEVICE — BANDAGE,GAUZE,CONFORMING,3"X75",STRL,LF: Brand: MEDLINE

## (undated) DEVICE — MASTISOL ADHESIVE LIQ 2/3ML

## (undated) DEVICE — PACK PROCEDURE SURG POST LAMINECTOMY CDS

## (undated) DEVICE — CATHETER GUID 6FR L150CM RAP EXCHG L25CM TIP 5.1FR PUSHROD

## (undated) DEVICE — 1010 S-DRAPE TOWEL DRAPE 10/BX: Brand: STERI-DRAPE™

## (undated) DEVICE — DRILL BIT

## (undated) DEVICE — SUTURE VCRL + SZ 3-0 L18IN ABSRB UD PS-2 3/8 CIR REV CUT VCP497H

## (undated) DEVICE — 2000CC GUARDIAN II: Brand: GUARDIAN

## (undated) DEVICE — FLOSEAL MATRIX IS INDICATED IN SURGICAL PROCEDURES (OTHER THAN IN OPHTHALMIC) AS AN ADJUNCT TO HEMOSTASIS WHEN CONTROL OF BLEEDING BY LIGATURE OR CONVENTIONALPROCEDURES IS INEFFECTIVE OR IMPRACTICAL.: Brand: FLOSEAL HEMOSTATIC MATRIX

## (undated) DEVICE — CATHETER ANGIO 5FR L110CM 155DEG GRY S STL NYL VENT ANG

## (undated) DEVICE — DRAPE SHT 3 QTR PROXIMA 53X77 --

## (undated) DEVICE — K-WIRE BLUNT/TROCAR
Type: IMPLANTABLE DEVICE | Site: FOOT | Status: NON-FUNCTIONAL
Removed: 2017-10-05

## (undated) DEVICE — SUTURE MCRYL SZ 3-0 L27IN ABSRB UD L19MM PS-2 3/8 CIR PRIM Y427H

## (undated) DEVICE — CATH BLLN ANGIO 3X12MM NC EUPHORIA RX

## (undated) DEVICE — INTENDED FOR TISSUE SEPARATION, AND OTHER PROCEDURES THAT REQUIRE A SHARP SURGICAL BLADE TO PUNCTURE OR CUT.: Brand: BARD-PARKER SAFETY BLADES SIZE 10, STERILE

## (undated) DEVICE — SUTURE VCRL SZ 2-0 L27IN ABSRB UD L36MM CP-1 1/2 CIR REV J266H

## (undated) DEVICE — BONE WAX WHITE: Brand: BONE WAX WHITE

## (undated) DEVICE — PTCA DILATATION CATHETER: Brand: EMERGE™

## (undated) DEVICE — CATH BLLN ANGIO 2X12MM SC EUPHORA RX

## (undated) DEVICE — 5.0MM PRECISION ROUND

## (undated) DEVICE — SYR 10ML LUER LOK 1/5ML GRAD --

## (undated) DEVICE — 3M™ TEGADERM™ TRANSPARENT FILM DRESSING FRAME STYLE, 1628, 6 IN X 8 IN (15 CM X 20 CM), 10/CT 8CT/CASE: Brand: 3M™ TEGADERM™

## (undated) DEVICE — 3M™ TEGADERM™ TRANSPARENT FILM DRESSING FRAME STYLE, 1626W, 4 IN X 4-3/4 IN (10 CM X 12 CM), 50/CT 4CT/CASE: Brand: 3M™ TEGADERM™

## (undated) DEVICE — AMD ANTIMICROBIAL GAUZE SPONGES,12 PLY USP TYPE VII, 0.2% POLYHEXAMETHYLENE BIGUANIDE HCI (PHMB): Brand: CURITY

## (undated) DEVICE — SUT ETHLN 3-0 18IN PS1 BLK --

## (undated) DEVICE — GLIDESHEATH SLENDER STAINLESS STEEL KIT: Brand: GLIDESHEATH SLENDER